# Patient Record
Sex: MALE | Race: BLACK OR AFRICAN AMERICAN | NOT HISPANIC OR LATINO | URBAN - METROPOLITAN AREA
[De-identification: names, ages, dates, MRNs, and addresses within clinical notes are randomized per-mention and may not be internally consistent; named-entity substitution may affect disease eponyms.]

---

## 2021-08-28 ENCOUNTER — INPATIENT (INPATIENT)
Facility: HOSPITAL | Age: 36
LOS: 3 days | Discharge: HOME | End: 2021-09-01
Attending: STUDENT IN AN ORGANIZED HEALTH CARE EDUCATION/TRAINING PROGRAM | Admitting: STUDENT IN AN ORGANIZED HEALTH CARE EDUCATION/TRAINING PROGRAM
Payer: COMMERCIAL

## 2021-08-28 VITALS
SYSTOLIC BLOOD PRESSURE: 136 MMHG | OXYGEN SATURATION: 98 % | HEART RATE: 109 BPM | WEIGHT: 315 LBS | DIASTOLIC BLOOD PRESSURE: 94 MMHG | RESPIRATION RATE: 16 BRPM | TEMPERATURE: 100 F

## 2021-08-28 LAB
ALBUMIN SERPL ELPH-MCNC: 4.4 G/DL — SIGNIFICANT CHANGE UP (ref 3.3–5)
ALP SERPL-CCNC: 92 U/L — SIGNIFICANT CHANGE UP (ref 40–120)
ALT FLD-CCNC: 50 U/L — HIGH (ref 4–41)
ANION GAP SERPL CALC-SCNC: 19 MMOL/L — HIGH (ref 7–14)
ANION GAP SERPL CALC-SCNC: 23 MMOL/L — HIGH (ref 7–14)
ANION GAP SERPL CALC-SCNC: 26 MMOL/L — HIGH (ref 7–14)
ANION GAP SERPL CALC-SCNC: 27 MMOL/L — HIGH (ref 7–14)
APPEARANCE UR: CLEAR — SIGNIFICANT CHANGE UP
AST SERPL-CCNC: 80 U/L — HIGH (ref 4–40)
BACTERIA # UR AUTO: NEGATIVE — SIGNIFICANT CHANGE UP
BASOPHILS # BLD AUTO: 0.05 K/UL — SIGNIFICANT CHANGE UP (ref 0–0.2)
BASOPHILS NFR BLD AUTO: 0.7 % — SIGNIFICANT CHANGE UP (ref 0–2)
BILIRUB SERPL-MCNC: 0.5 MG/DL — SIGNIFICANT CHANGE UP (ref 0.2–1.2)
BILIRUB UR-MCNC: NEGATIVE — SIGNIFICANT CHANGE UP
BLOOD GAS VENOUS COMPREHENSIVE RESULT: SIGNIFICANT CHANGE UP
BUN SERPL-MCNC: 10 MG/DL — SIGNIFICANT CHANGE UP (ref 7–23)
BUN SERPL-MCNC: 10 MG/DL — SIGNIFICANT CHANGE UP (ref 7–23)
BUN SERPL-MCNC: 9 MG/DL — SIGNIFICANT CHANGE UP (ref 7–23)
BUN SERPL-MCNC: 9 MG/DL — SIGNIFICANT CHANGE UP (ref 7–23)
CALCIUM SERPL-MCNC: 8.1 MG/DL — LOW (ref 8.4–10.5)
CALCIUM SERPL-MCNC: 8.7 MG/DL — SIGNIFICANT CHANGE UP (ref 8.4–10.5)
CALCIUM SERPL-MCNC: 9.2 MG/DL — SIGNIFICANT CHANGE UP (ref 8.4–10.5)
CALCIUM SERPL-MCNC: 9.8 MG/DL — SIGNIFICANT CHANGE UP (ref 8.4–10.5)
CHLORIDE SERPL-SCNC: 86 MMOL/L — LOW (ref 98–107)
CHLORIDE SERPL-SCNC: 89 MMOL/L — LOW (ref 98–107)
CHLORIDE SERPL-SCNC: 94 MMOL/L — LOW (ref 98–107)
CHLORIDE SERPL-SCNC: 97 MMOL/L — LOW (ref 98–107)
CO2 SERPL-SCNC: 13 MMOL/L — LOW (ref 22–31)
CO2 SERPL-SCNC: 15 MMOL/L — LOW (ref 22–31)
CO2 SERPL-SCNC: 17 MMOL/L — LOW (ref 22–31)
CO2 SERPL-SCNC: 18 MMOL/L — LOW (ref 22–31)
COLOR SPEC: SIGNIFICANT CHANGE UP
CREAT SERPL-MCNC: 0.75 MG/DL — SIGNIFICANT CHANGE UP (ref 0.5–1.3)
CREAT SERPL-MCNC: 0.84 MG/DL — SIGNIFICANT CHANGE UP (ref 0.5–1.3)
CREAT SERPL-MCNC: 0.85 MG/DL — SIGNIFICANT CHANGE UP (ref 0.5–1.3)
CREAT SERPL-MCNC: 0.85 MG/DL — SIGNIFICANT CHANGE UP (ref 0.5–1.3)
DIFF PNL FLD: NEGATIVE — SIGNIFICANT CHANGE UP
EOSINOPHIL # BLD AUTO: 0.15 K/UL — SIGNIFICANT CHANGE UP (ref 0–0.5)
EOSINOPHIL NFR BLD AUTO: 2.2 % — SIGNIFICANT CHANGE UP (ref 0–6)
EPI CELLS # UR: 2 /HPF — SIGNIFICANT CHANGE UP (ref 0–5)
GLUCOSE SERPL-MCNC: 305 MG/DL — HIGH (ref 70–99)
GLUCOSE SERPL-MCNC: 355 MG/DL — HIGH (ref 70–99)
GLUCOSE SERPL-MCNC: 439 MG/DL — HIGH (ref 70–99)
GLUCOSE SERPL-MCNC: 521 MG/DL — CRITICAL HIGH (ref 70–99)
GLUCOSE UR QL: ABNORMAL
HCT VFR BLD CALC: 45.4 % — SIGNIFICANT CHANGE UP (ref 39–50)
HGB BLD-MCNC: 16.5 G/DL — SIGNIFICANT CHANGE UP (ref 13–17)
IANC: 3.95 K/UL — SIGNIFICANT CHANGE UP (ref 1.5–8.5)
IMM GRANULOCYTES NFR BLD AUTO: 0.4 % — SIGNIFICANT CHANGE UP (ref 0–1.5)
KETONES UR-MCNC: ABNORMAL
LEUKOCYTE ESTERASE UR-ACNC: NEGATIVE — SIGNIFICANT CHANGE UP
LYMPHOCYTES # BLD AUTO: 2.13 K/UL — SIGNIFICANT CHANGE UP (ref 1–3.3)
LYMPHOCYTES # BLD AUTO: 31.5 % — SIGNIFICANT CHANGE UP (ref 13–44)
MAGNESIUM SERPL-MCNC: 2.1 MG/DL — SIGNIFICANT CHANGE UP (ref 1.6–2.6)
MAGNESIUM SERPL-MCNC: 2.1 MG/DL — SIGNIFICANT CHANGE UP (ref 1.6–2.6)
MCHC RBC-ENTMCNC: 30.3 PG — SIGNIFICANT CHANGE UP (ref 27–34)
MCHC RBC-ENTMCNC: 36.3 GM/DL — HIGH (ref 32–36)
MCV RBC AUTO: 83.3 FL — SIGNIFICANT CHANGE UP (ref 80–100)
MONOCYTES # BLD AUTO: 0.46 K/UL — SIGNIFICANT CHANGE UP (ref 0–0.9)
MONOCYTES NFR BLD AUTO: 6.8 % — SIGNIFICANT CHANGE UP (ref 2–14)
NEUTROPHILS # BLD AUTO: 3.95 K/UL — SIGNIFICANT CHANGE UP (ref 1.8–7.4)
NEUTROPHILS NFR BLD AUTO: 58.4 % — SIGNIFICANT CHANGE UP (ref 43–77)
NITRITE UR-MCNC: NEGATIVE — SIGNIFICANT CHANGE UP
NRBC # BLD: 0 /100 WBCS — SIGNIFICANT CHANGE UP
NRBC # FLD: 0 K/UL — SIGNIFICANT CHANGE UP
PH UR: 6.5 — SIGNIFICANT CHANGE UP (ref 5–8)
PHOSPHATE SERPL-MCNC: 3.9 MG/DL — SIGNIFICANT CHANGE UP (ref 2.5–4.5)
PHOSPHATE SERPL-MCNC: SIGNIFICANT CHANGE UP MG/DL (ref 2.5–4.5)
PLATELET # BLD AUTO: 266 K/UL — SIGNIFICANT CHANGE UP (ref 150–400)
POTASSIUM SERPL-MCNC: 5 MMOL/L — SIGNIFICANT CHANGE UP (ref 3.5–5.3)
POTASSIUM SERPL-MCNC: SIGNIFICANT CHANGE UP MMOL/L (ref 3.5–5.3)
POTASSIUM SERPL-SCNC: 5 MMOL/L — SIGNIFICANT CHANGE UP (ref 3.5–5.3)
POTASSIUM SERPL-SCNC: SIGNIFICANT CHANGE UP MMOL/L (ref 3.5–5.3)
PROT SERPL-MCNC: SIGNIFICANT CHANGE UP G/DL (ref 6–8.3)
PROT UR-MCNC: ABNORMAL
RBC # BLD: 5.45 M/UL — SIGNIFICANT CHANGE UP (ref 4.2–5.8)
RBC # FLD: 12.8 % — SIGNIFICANT CHANGE UP (ref 10.3–14.5)
RBC CASTS # UR COMP ASSIST: 1 /HPF — SIGNIFICANT CHANGE UP (ref 0–4)
SODIUM SERPL-SCNC: 126 MMOL/L — LOW (ref 135–145)
SODIUM SERPL-SCNC: 130 MMOL/L — LOW (ref 135–145)
SODIUM SERPL-SCNC: 134 MMOL/L — LOW (ref 135–145)
SODIUM SERPL-SCNC: 134 MMOL/L — LOW (ref 135–145)
SP GR SPEC: 1.04 — SIGNIFICANT CHANGE UP (ref 1–1.05)
UROBILINOGEN FLD QL: SIGNIFICANT CHANGE UP
WBC # BLD: 6.77 K/UL — SIGNIFICANT CHANGE UP (ref 3.8–10.5)
WBC # FLD AUTO: 6.77 K/UL — SIGNIFICANT CHANGE UP (ref 3.8–10.5)
WBC UR QL: 5 /HPF — SIGNIFICANT CHANGE UP (ref 0–5)

## 2021-08-28 PROCEDURE — 71046 X-RAY EXAM CHEST 2 VIEWS: CPT | Mod: 26

## 2021-08-28 RX ORDER — INSULIN HUMAN 100 [IU]/ML
5 INJECTION, SOLUTION SUBCUTANEOUS ONCE
Refills: 0 | Status: COMPLETED | OUTPATIENT
Start: 2021-08-28 | End: 2021-08-28

## 2021-08-28 RX ORDER — SODIUM CHLORIDE 9 MG/ML
1000 INJECTION INTRAMUSCULAR; INTRAVENOUS; SUBCUTANEOUS ONCE
Refills: 0 | Status: COMPLETED | OUTPATIENT
Start: 2021-08-28 | End: 2021-08-28

## 2021-08-28 RX ORDER — INSULIN LISPRO 100/ML
10 VIAL (ML) SUBCUTANEOUS ONCE
Refills: 0 | Status: COMPLETED | OUTPATIENT
Start: 2021-08-28 | End: 2021-08-28

## 2021-08-28 RX ADMIN — SODIUM CHLORIDE 1000 MILLILITER(S): 9 INJECTION INTRAMUSCULAR; INTRAVENOUS; SUBCUTANEOUS at 15:43

## 2021-08-28 RX ADMIN — Medication 10 UNIT(S): at 18:01

## 2021-08-28 RX ADMIN — Medication 50 MILLIGRAM(S): at 17:58

## 2021-08-28 RX ADMIN — INSULIN HUMAN 5 UNIT(S): 100 INJECTION, SOLUTION SUBCUTANEOUS at 21:17

## 2021-08-28 RX ADMIN — SODIUM CHLORIDE 1000 MILLILITER(S): 9 INJECTION INTRAMUSCULAR; INTRAVENOUS; SUBCUTANEOUS at 21:57

## 2021-08-28 NOTE — ED PROVIDER NOTE - NS ED ROS FT
GENERAL: No fever or chills  EYES: no change in vision  HEENT: no trouble swallowing or speaking  CARDIAC: no chest pain or palpiations   PULMONARY: no cough or SOB  GI: no abdominal pain, nausea, vomiting, diarrhea, or constipation   : see hpi  SKIN: no rashes  NEURO: no headache, numbness, or weakness.  MSK: No joint pain

## 2021-08-28 NOTE — ED PROVIDER NOTE - ATTENDING CONTRIBUTION TO CARE
I have personally performed a face to face medical and diagnostic evaluation of the patient. I have discussed with and reviewed the Resident's note and agree with the History, ROS, Physical Exam and MDM unless otherwise indicated. A brief summary of my personal evaluation and impression can be found below.     34 yo M with pre-diabetes, obesity and alcohol use presents with 2 weeks of weakness, polyuria and polydipsia with a FS of 500. Reports mild suprapubic pressure and urinary frequency. Denies dysuria. No flank pain. No cough or fever. No N, V or D. No chest pain or SOB. Drinks 6 beers daily. No hx of alcohol withdrawal or seizures. Last drink was yesterday.  VITALS: Initial triage and subsequent vitals have been reviewed by me.  Gen: Well appearing, NAD, alert, non-toxic  Head: NCAT  HEENT: MMM, normal conjunctiva, anicteric, neck supple  Lung: CTAB, no adventitious sounds  CV: RRR, no murmurs, 2+symmetric peripheral pulses  Abd: soft, NTND, no rebound or guarding, no palpable masses  MSK: No edema, no visible deformities  Neuro: Moving all extremity grossly, following commands appropriately, fluid speech, no tremors/fasciculations  Skin: Warm and dry, no evidence of rash  Psych: normal mood and affect  Concern of DKA will get labs r/o dka and xr/ua r/o underlying infx process. If not in dka will likely need CDU for endo eval in AM

## 2021-08-28 NOTE — ED PROVIDER NOTE - CLINICAL SUMMARY MEDICAL DECISION MAKING FREE TEXT BOX
36 yo M with pre-diabetes, obesity and alcohol use presents with 2 weeks of weakness, polyuria and polydipsia with a FS of 500. Mild suprapubic ttp. Hyperglycemia could 2/2 to underlying infection (UTI), vs DKA. Will obtain basic labs, A1C, vbg, start fluids and reassess.

## 2021-08-28 NOTE — ED ADULT TRIAGE NOTE - CHIEF COMPLAINT QUOTE
c/o increased urinary frequency with occasional suprapubic discomfort and fatigue x 1 week. Sent by urgent care for elevated glucose. FS here 573

## 2021-08-28 NOTE — ED PROVIDER NOTE - OBJECTIVE STATEMENT
36 yo M with pre-diabetes, obesity and alcohol use presents with 2 weeks of weakness, polyuria and polydipsia with a FS of 500. Reports mild suprapubic pressure and urinary frequency. Denies dysuria. No flank pain. No cough or fever. No N, V or D. No chest pain or SOB. Drinks 6 beers daily. No hx of alcohol withdrawal or seizures. Last drink was yesterday.

## 2021-08-28 NOTE — ED PROVIDER NOTE - PHYSICAL EXAMINATION
Gen: AAOx3, non-toxic  Head: NCAT  HEENT: EOMI, oral mucosa dry, normal conjunctiva  Lung: CTAB, no respiratory distress, no wheezes/rhonchi/rales B/L, speaking in full sentences  CV: RRR, no murmurs, rubs or gallops  Abd: mild suprapubic ttp, no guarding, mid R  CVA tenderness  MSK: no visible deformities  Neuro: No focal sensory or motor deficits, normal CN exam. No tremors.   Skin: Warm, well perfused, no rash  Psych: normal affect.

## 2021-08-29 DIAGNOSIS — E11.10 TYPE 2 DIABETES MELLITUS WITH KETOACIDOSIS WITHOUT COMA: ICD-10-CM

## 2021-08-29 DIAGNOSIS — I10 ESSENTIAL (PRIMARY) HYPERTENSION: ICD-10-CM

## 2021-08-29 DIAGNOSIS — E11.9 TYPE 2 DIABETES MELLITUS WITHOUT COMPLICATIONS: ICD-10-CM

## 2021-08-29 DIAGNOSIS — E78.5 HYPERLIPIDEMIA, UNSPECIFIED: ICD-10-CM

## 2021-08-29 DIAGNOSIS — E11.65 TYPE 2 DIABETES MELLITUS WITH HYPERGLYCEMIA: ICD-10-CM

## 2021-08-29 DIAGNOSIS — Z29.9 ENCOUNTER FOR PROPHYLACTIC MEASURES, UNSPECIFIED: ICD-10-CM

## 2021-08-29 DIAGNOSIS — E66.01 MORBID (SEVERE) OBESITY DUE TO EXCESS CALORIES: ICD-10-CM

## 2021-08-29 DIAGNOSIS — F10.10 ALCOHOL ABUSE, UNCOMPLICATED: ICD-10-CM

## 2021-08-29 LAB
ANION GAP SERPL CALC-SCNC: 18 MMOL/L — HIGH (ref 7–14)
ANION GAP SERPL CALC-SCNC: 22 MMOL/L — HIGH (ref 7–14)
ANION GAP SERPL CALC-SCNC: 23 MMOL/L — HIGH (ref 7–14)
B-OH-BUTYR SERPL-SCNC: 2.6 MMOL/L — HIGH (ref 0–0.4)
BASOPHILS # BLD AUTO: 0.02 K/UL — SIGNIFICANT CHANGE UP (ref 0–0.2)
BASOPHILS NFR BLD AUTO: 0.3 % — SIGNIFICANT CHANGE UP (ref 0–2)
BLOOD GAS VENOUS COMPREHENSIVE RESULT: SIGNIFICANT CHANGE UP
BUN SERPL-MCNC: 9 MG/DL — SIGNIFICANT CHANGE UP (ref 7–23)
CALCIUM SERPL-MCNC: 8.1 MG/DL — LOW (ref 8.4–10.5)
CALCIUM SERPL-MCNC: 8.1 MG/DL — LOW (ref 8.4–10.5)
CALCIUM SERPL-MCNC: 8.3 MG/DL — LOW (ref 8.4–10.5)
CHLORIDE SERPL-SCNC: 95 MMOL/L — LOW (ref 98–107)
CO2 SERPL-SCNC: 15 MMOL/L — LOW (ref 22–31)
CO2 SERPL-SCNC: 15 MMOL/L — LOW (ref 22–31)
CO2 SERPL-SCNC: 18 MMOL/L — LOW (ref 22–31)
CREAT SERPL-MCNC: 0.78 MG/DL — SIGNIFICANT CHANGE UP (ref 0.5–1.3)
CREAT SERPL-MCNC: 0.81 MG/DL — SIGNIFICANT CHANGE UP (ref 0.5–1.3)
CREAT SERPL-MCNC: 0.82 MG/DL — SIGNIFICANT CHANGE UP (ref 0.5–1.3)
EOSINOPHIL # BLD AUTO: 0.26 K/UL — SIGNIFICANT CHANGE UP (ref 0–0.5)
EOSINOPHIL NFR BLD AUTO: 4.4 % — SIGNIFICANT CHANGE UP (ref 0–6)
GLUCOSE BLDC GLUCOMTR-MCNC: 232 MG/DL — HIGH (ref 70–99)
GLUCOSE BLDC GLUCOMTR-MCNC: 233 MG/DL — HIGH (ref 70–99)
GLUCOSE BLDC GLUCOMTR-MCNC: 272 MG/DL — HIGH (ref 70–99)
GLUCOSE BLDC GLUCOMTR-MCNC: 295 MG/DL — HIGH (ref 70–99)
GLUCOSE SERPL-MCNC: 247 MG/DL — HIGH (ref 70–99)
GLUCOSE SERPL-MCNC: 316 MG/DL — HIGH (ref 70–99)
GLUCOSE SERPL-MCNC: 367 MG/DL — HIGH (ref 70–99)
HCT VFR BLD CALC: 43.8 % — SIGNIFICANT CHANGE UP (ref 39–50)
HGB BLD-MCNC: 15 G/DL — SIGNIFICANT CHANGE UP (ref 13–17)
IANC: 3.51 K/UL — SIGNIFICANT CHANGE UP (ref 1.5–8.5)
IMM GRANULOCYTES NFR BLD AUTO: 0.3 % — SIGNIFICANT CHANGE UP (ref 0–1.5)
LYMPHOCYTES # BLD AUTO: 1.78 K/UL — SIGNIFICANT CHANGE UP (ref 1–3.3)
LYMPHOCYTES # BLD AUTO: 29.8 % — SIGNIFICANT CHANGE UP (ref 13–44)
MAGNESIUM SERPL-MCNC: 1.9 MG/DL — SIGNIFICANT CHANGE UP (ref 1.6–2.6)
MCHC RBC-ENTMCNC: 29.7 PG — SIGNIFICANT CHANGE UP (ref 27–34)
MCHC RBC-ENTMCNC: 34.2 GM/DL — SIGNIFICANT CHANGE UP (ref 32–36)
MCV RBC AUTO: 86.7 FL — SIGNIFICANT CHANGE UP (ref 80–100)
MONOCYTES # BLD AUTO: 0.38 K/UL — SIGNIFICANT CHANGE UP (ref 0–0.9)
MONOCYTES NFR BLD AUTO: 6.4 % — SIGNIFICANT CHANGE UP (ref 2–14)
NEUTROPHILS # BLD AUTO: 3.51 K/UL — SIGNIFICANT CHANGE UP (ref 1.8–7.4)
NEUTROPHILS NFR BLD AUTO: 58.8 % — SIGNIFICANT CHANGE UP (ref 43–77)
NRBC # BLD: 0 /100 WBCS — SIGNIFICANT CHANGE UP
NRBC # FLD: 0 K/UL — SIGNIFICANT CHANGE UP
PHOSPHATE SERPL-MCNC: 2.7 MG/DL — SIGNIFICANT CHANGE UP (ref 2.5–4.5)
PLATELET # BLD AUTO: 234 K/UL — SIGNIFICANT CHANGE UP (ref 150–400)
POTASSIUM SERPL-MCNC: 4.5 MMOL/L — SIGNIFICANT CHANGE UP (ref 3.5–5.3)
POTASSIUM SERPL-MCNC: 4.7 MMOL/L — SIGNIFICANT CHANGE UP (ref 3.5–5.3)
POTASSIUM SERPL-MCNC: SIGNIFICANT CHANGE UP MMOL/L (ref 3.5–5.3)
POTASSIUM SERPL-SCNC: 4.5 MMOL/L — SIGNIFICANT CHANGE UP (ref 3.5–5.3)
POTASSIUM SERPL-SCNC: 4.7 MMOL/L — SIGNIFICANT CHANGE UP (ref 3.5–5.3)
POTASSIUM SERPL-SCNC: SIGNIFICANT CHANGE UP MMOL/L (ref 3.5–5.3)
RBC # BLD: 5.05 M/UL — SIGNIFICANT CHANGE UP (ref 4.2–5.8)
RBC # FLD: 12.9 % — SIGNIFICANT CHANGE UP (ref 10.3–14.5)
SARS-COV-2 RNA SPEC QL NAA+PROBE: SIGNIFICANT CHANGE UP
SODIUM SERPL-SCNC: 131 MMOL/L — LOW (ref 135–145)
SODIUM SERPL-SCNC: 132 MMOL/L — LOW (ref 135–145)
SODIUM SERPL-SCNC: 133 MMOL/L — LOW (ref 135–145)
WBC # BLD: 5.97 K/UL — SIGNIFICANT CHANGE UP (ref 3.8–10.5)
WBC # FLD AUTO: 5.97 K/UL — SIGNIFICANT CHANGE UP (ref 3.8–10.5)

## 2021-08-29 PROCEDURE — 99223 1ST HOSP IP/OBS HIGH 75: CPT | Mod: GC

## 2021-08-29 PROCEDURE — 99223 1ST HOSP IP/OBS HIGH 75: CPT

## 2021-08-29 RX ORDER — INSULIN LISPRO 100/ML
VIAL (ML) SUBCUTANEOUS AT BEDTIME
Refills: 0 | Status: DISCONTINUED | OUTPATIENT
Start: 2021-08-29 | End: 2021-08-29

## 2021-08-29 RX ORDER — ONDANSETRON 8 MG/1
4 TABLET, FILM COATED ORAL EVERY 8 HOURS
Refills: 0 | Status: DISCONTINUED | OUTPATIENT
Start: 2021-08-29 | End: 2021-09-01

## 2021-08-29 RX ORDER — SODIUM CHLORIDE 9 MG/ML
1000 INJECTION INTRAMUSCULAR; INTRAVENOUS; SUBCUTANEOUS
Refills: 0 | Status: DISCONTINUED | OUTPATIENT
Start: 2021-08-29 | End: 2021-08-29

## 2021-08-29 RX ORDER — SODIUM CHLORIDE 9 MG/ML
1000 INJECTION, SOLUTION INTRAVENOUS
Refills: 0 | Status: DISCONTINUED | OUTPATIENT
Start: 2021-08-29 | End: 2021-09-01

## 2021-08-29 RX ORDER — POTASSIUM CHLORIDE 20 MEQ
40 PACKET (EA) ORAL EVERY 4 HOURS
Refills: 0 | Status: COMPLETED | OUTPATIENT
Start: 2021-08-29 | End: 2021-08-29

## 2021-08-29 RX ORDER — LANOLIN ALCOHOL/MO/W.PET/CERES
3 CREAM (GRAM) TOPICAL AT BEDTIME
Refills: 0 | Status: DISCONTINUED | OUTPATIENT
Start: 2021-08-29 | End: 2021-09-01

## 2021-08-29 RX ORDER — INSULIN LISPRO 100/ML
10 VIAL (ML) SUBCUTANEOUS
Refills: 0 | Status: DISCONTINUED | OUTPATIENT
Start: 2021-08-29 | End: 2021-08-29

## 2021-08-29 RX ORDER — POTASSIUM CHLORIDE 20 MEQ
40 PACKET (EA) ORAL ONCE
Refills: 0 | Status: COMPLETED | OUTPATIENT
Start: 2021-08-29 | End: 2021-08-29

## 2021-08-29 RX ORDER — ENOXAPARIN SODIUM 100 MG/ML
40 INJECTION SUBCUTANEOUS DAILY
Refills: 0 | Status: DISCONTINUED | OUTPATIENT
Start: 2021-08-29 | End: 2021-09-01

## 2021-08-29 RX ORDER — INSULIN LISPRO 100/ML
15 VIAL (ML) SUBCUTANEOUS EVERY 4 HOURS
Refills: 0 | Status: DISCONTINUED | OUTPATIENT
Start: 2021-08-29 | End: 2021-08-29

## 2021-08-29 RX ORDER — POTASSIUM CHLORIDE 20 MEQ
40 PACKET (EA) ORAL EVERY 4 HOURS
Refills: 0 | Status: DISCONTINUED | OUTPATIENT
Start: 2021-08-29 | End: 2021-08-29

## 2021-08-29 RX ORDER — INSULIN LISPRO 100/ML
VIAL (ML) SUBCUTANEOUS
Refills: 0 | Status: DISCONTINUED | OUTPATIENT
Start: 2021-08-29 | End: 2021-08-29

## 2021-08-29 RX ORDER — DEXTROSE 50 % IN WATER 50 %
25 SYRINGE (ML) INTRAVENOUS ONCE
Refills: 0 | Status: DISCONTINUED | OUTPATIENT
Start: 2021-08-29 | End: 2021-09-01

## 2021-08-29 RX ORDER — SODIUM CHLORIDE 9 MG/ML
1000 INJECTION INTRAMUSCULAR; INTRAVENOUS; SUBCUTANEOUS
Refills: 0 | Status: DISCONTINUED | OUTPATIENT
Start: 2021-08-29 | End: 2021-08-30

## 2021-08-29 RX ORDER — INSULIN GLARGINE 100 [IU]/ML
31 INJECTION, SOLUTION SUBCUTANEOUS ONCE
Refills: 0 | Status: COMPLETED | OUTPATIENT
Start: 2021-08-29 | End: 2021-08-29

## 2021-08-29 RX ORDER — INSULIN LISPRO 100/ML
7 VIAL (ML) SUBCUTANEOUS EVERY 4 HOURS
Refills: 0 | Status: DISCONTINUED | OUTPATIENT
Start: 2021-08-29 | End: 2021-08-30

## 2021-08-29 RX ORDER — DEXTROSE 50 % IN WATER 50 %
15 SYRINGE (ML) INTRAVENOUS ONCE
Refills: 0 | Status: DISCONTINUED | OUTPATIENT
Start: 2021-08-29 | End: 2021-09-01

## 2021-08-29 RX ORDER — INSULIN GLARGINE 100 [IU]/ML
36 INJECTION, SOLUTION SUBCUTANEOUS AT BEDTIME
Refills: 0 | Status: DISCONTINUED | OUTPATIENT
Start: 2021-08-29 | End: 2021-08-30

## 2021-08-29 RX ORDER — SODIUM CHLORIDE 9 MG/ML
1000 INJECTION, SOLUTION INTRAVENOUS ONCE
Refills: 0 | Status: COMPLETED | OUTPATIENT
Start: 2021-08-29 | End: 2021-08-29

## 2021-08-29 RX ORDER — DEXTROSE 50 % IN WATER 50 %
12.5 SYRINGE (ML) INTRAVENOUS ONCE
Refills: 0 | Status: DISCONTINUED | OUTPATIENT
Start: 2021-08-29 | End: 2021-09-01

## 2021-08-29 RX ORDER — GLUCAGON INJECTION, SOLUTION 0.5 MG/.1ML
1 INJECTION, SOLUTION SUBCUTANEOUS ONCE
Refills: 0 | Status: DISCONTINUED | OUTPATIENT
Start: 2021-08-29 | End: 2021-09-01

## 2021-08-29 RX ADMIN — Medication 10 UNIT(S): at 07:47

## 2021-08-29 RX ADMIN — Medication 10 UNIT(S): at 12:22

## 2021-08-29 RX ADMIN — SODIUM CHLORIDE 125 MILLILITER(S): 9 INJECTION INTRAMUSCULAR; INTRAVENOUS; SUBCUTANEOUS at 22:22

## 2021-08-29 RX ADMIN — SODIUM CHLORIDE 125 MILLILITER(S): 9 INJECTION INTRAMUSCULAR; INTRAVENOUS; SUBCUTANEOUS at 04:12

## 2021-08-29 RX ADMIN — Medication 6: at 12:22

## 2021-08-29 RX ADMIN — Medication 40 MILLIEQUIVALENT(S): at 14:47

## 2021-08-29 RX ADMIN — INSULIN GLARGINE 31 UNIT(S): 100 INJECTION, SOLUTION SUBCUTANEOUS at 01:10

## 2021-08-29 RX ADMIN — Medication 15 UNIT(S): at 17:18

## 2021-08-29 RX ADMIN — SODIUM CHLORIDE 125 MILLILITER(S): 9 INJECTION INTRAMUSCULAR; INTRAVENOUS; SUBCUTANEOUS at 16:09

## 2021-08-29 RX ADMIN — SODIUM CHLORIDE 1000 MILLILITER(S): 9 INJECTION, SOLUTION INTRAVENOUS at 13:11

## 2021-08-29 RX ADMIN — INSULIN GLARGINE 36 UNIT(S): 100 INJECTION, SOLUTION SUBCUTANEOUS at 22:12

## 2021-08-29 RX ADMIN — Medication 40 MILLIEQUIVALENT(S): at 22:52

## 2021-08-29 RX ADMIN — Medication 8: at 07:45

## 2021-08-29 RX ADMIN — Medication 7 UNIT(S): at 22:17

## 2021-08-29 NOTE — ED CDU PROVIDER DISPOSITION NOTE - ATTENDING CONTRIBUTION TO CARE
35M h/o pre-DM, obesity, ETOH abuse (6 beers/day, no h/o withdrawal), sent from urgent care for hyperglycemia with FS 500s. Pt reports polyuria x 2wks, polydipsia, fatigue. Initial labs showed AG of 27, pH normal, beta hydroxy 2.0. Received 3L IVF, Humilin R 5units and sent to CDU. In CDU received maintenance fluids, Lantus 31 units at bedtime, Admelog 10 units with meals and sliding scale. FS remain in the 300s, HbA1c 10.8, AG 19-23. Endocrine previously consulted, tba for glycemic control and endo follow-up.

## 2021-08-29 NOTE — H&P ADULT - HISTORY OF PRESENT ILLNESS
Reports mild suprapubic pressure and urinary frequency. Denies dysuria. No flank pain. No cough or fever. No N, V or D. No chest pain or SOB. Drinks 6 beers daily. No hx of alcohol withdrawal or seizures. Last drink was yesterday.    34 yo M with PMH of Pre-DM, obesity, ETOH abuse presenting with hyperglycemia, FS 500s w/ 2 weeks of polyuria, polydipsia, fatigue. Drinks 6 beers daily, no hx of withdrawal. Last drink 8/26.       In ED labs showing FS 500s, anion gap 27, pH normal, beta hydroxy 2.0. Now s/p 3L fluids, Humilin R 5 units pt sent to CDU. In CDU received maintenance fluids, Lantus 31 units at bedtime, Admelog 10 units with meals and sliding scale. Pt continues to have hyperglycemia, cbc w/ glucose 400 this morning, pH normal on rpt gas. Endocrine previously consulted, plan for admission. 36 yo M with PMH of Pre-DM, obesity, ETOH abuse presenting with hyperglycemia, FS 500s w/ 2 weeks of polyuria, polydipsia, fatigue. Has not followed regularly with any PCP. Went to urgent care after feeling significant weakness, found to have sugars in 500s. Additionally, drinks 6 beers daily. Tried to quit around a year ago but felt tachycardic and uncomfortable so resumed drinking. Has never had a seizure. Last drink 8/26. Also drinking alot of sodas in past few weeks as he felt very thirsty. Pt feels very motivated to quit drinking and get sugars under control this admission. Also reports mild suprapubic pressure and urinary frequency. Pt has noticed small wounds do not heal very fast. Also feels like he has mild leg edema, pain/numbness in his feet.    In ED labs showing FS 500s, anion gap 27, pH normal, beta hydroxy 2.0. UA noninfectious. Now s/p 3L fluids, Humilin R 5 units pt sent to CDU. In CDU received maintenance fluids, Lantus 31 units at bedtime, Admelog 10 units with meals and sliding scale. FS now still in 300s, endo consulted. Remains on IVFs, NPO.

## 2021-08-29 NOTE — H&P ADULT - NSHPLABSRESULTS_GEN_ALL_CORE
15.0   5.97  )-----------( 234      ( 29 Aug 2021 07:50 )             43.8           133<L>  |  95<L>  |  9   ----------------------------<  367<H>  4.7   |  15<L>  |  0.82    Ca    8.1<L>      29 Aug 2021 09:37  Phos  3.9       Mg     2.10         TPro  TNP  /  Alb  4.4  /  TBili  0.5  /  DBili  x   /  AST  80<H>  /  ALT  50<H>  /  AlkPhos  92                Urinalysis Basic - ( 28 Aug 2021 16:36 )    Color: Light Yellow / Appearance: Clear / S.037 / pH: x  Gluc: x / Ketone: Small  / Bili: Negative / Urobili: <2 mg/dL   Blood: x / Protein: Trace / Nitrite: Negative   Leuk Esterase: Negative / RBC: 1 /HPF / WBC 5 /HPF   Sq Epi: x / Non Sq Epi: 2 /HPF / Bacteria: Negative            Lactate Trend            CAPILLARY BLOOD GLUCOSE    CAPILLARY BLOOD GLUCOSE      POCT Blood Glucose.: 295 mg/dL (29 Aug 2021 12:13)  POCT Blood Glucose.: 336 mg/dL (29 Aug 2021 07:20)  POCT Blood Glucose.: 315 mg/dL (29 Aug 2021 00:56)  POCT Blood Glucose.: 335 mg/dL (28 Aug 2021 23:34)  POCT Blood Glucose.: 337 mg/dL (28 Aug 2021 22:11)  POCT Blood Glucose.: 358 mg/dL (28 Aug 2021 20:18)  POCT Blood Glucose.: 362 mg/dL (28 Aug 2021 19:51)  POCT Blood Glucose.: 452 mg/dL (28 Aug 2021 17:50)  POCT Blood Glucose.: 571 mg/dL (28 Aug 2021 14:51)

## 2021-08-29 NOTE — H&P ADULT - ASSESSMENT
Mr. Quiroz is a 35M with h/o EtOH abuse, obesity, and pre-DM now presenting with hyperglycemia (A1c 10.8, FS 500s). Now s/p 4L bolus LR in ED with downtrending FS now in 300s.

## 2021-08-29 NOTE — PATIENT PROFILE ADULT - NSTRANSFERBELONGINGSDISPO_GEN_A_NUR
Type 2 diabetes mellitus Type 2 diabetes mellitus Type 2 diabetes mellitus Type 2 diabetes mellitus with patient Type 2 diabetes mellitus Type 2 diabetes mellitus Type 2 diabetes mellitus Type 2 diabetes mellitus Type 2 diabetes mellitus Type 2 diabetes mellitus Type 2 diabetes mellitus

## 2021-08-29 NOTE — H&P ADULT - PROBLEM SELECTOR PLAN 4
Diet: NPO except water with meds until FS<250  DVT ppx: lovenox 40 qd - Encouraged weight loss & lifestyle changes including dietary modifications & exercise.   - consider outpatient obesity medicine referral  - send lipid panel

## 2021-08-29 NOTE — H&P ADULT - PROBLEM SELECTOR PLAN 2
-poor outpatient follow up, A1c was 10.8  -will need outpatient diabetes teaching, PCP follow-up, foot and eye exams  -further recs for transition to outpatient regimen per endo -poor outpatient follow up, A1c was 10.8  -will need diabetes teaching, PCP follow-up, foot and eye exams  -further recs for transition to outpatient regimen per endo  -nutrition consult

## 2021-08-29 NOTE — ED CDU PROVIDER INITIAL DAY NOTE - OBJECTIVE STATEMENT
36 yo M with PMH of Pre-DM, obesity, ETOH abuse, sent from urgent care to Er c/o hyperglycemia with Fs of 500 a/w fatigue a/w polyuria, polydipsia for 2 weeks. Reports he feels tired, doesn't want to do anything at home. Admits he's drinking a lot of water and urinating more. Admits he drinks 6 beers daily for years, last drink yesterday, no hx of hospitalization for withdrawal or seizure. Denies any fever, chills, n/v/d/c, chest pain, sob, abdominal pain, back pain, hematuria, burning on urination, weakness, numbness, headache, palpitation, or any other complaints.

## 2021-08-29 NOTE — H&P ADULT - PROBLEM SELECTOR PLAN 3
6 beers per day, last drink 8/26. Has had mild withdrawal sxs in past but no seizure  -s/p librium 50mg 8/28 in ED  -pt is highly motivated to quit drinking  -No withdrawal sxs currently but pt is at high risk for withdrawal while inpatient  -Mild elevation in transaminases could be a sign of cirrhosis AST 80, ALT 50.  -RUQUS? 6 beers per day, last drink 8/26. Has had mild withdrawal sxs in past but no seizure  -s/p librium 50mg 8/28 in ED  -pt is highly motivated to quit drinking  -No withdrawal sxs currently but pt is at risk for withdrawal while inpatient  -symptom-triggered CIWAs to monitor  -Mild elevation in transaminases likely iso alcohol use AST 80, ALT 50. CTM 6 beers per day, last drink 8/26. Has had mild withdrawal sxs in past but no seizure  -s/p librium 50mg 8/28 in ED  -pt is highly motivated to quit drinking  -No withdrawal sxs currently but pt is at risk for withdrawal while inpatient  -symptom-triggered CIWAs to monitor  -Mild elevation in transaminases likely iso alcohol use AST 80, ALT 50. CTM LFTs

## 2021-08-29 NOTE — PHARMACOTHERAPY INTERVENTION NOTE - COMMENTS
Medication history is complete. Medication list updated in Outpatient Medication Record ([facility]). Please call spectra [phone] if you have any questions.

## 2021-08-29 NOTE — ED CDU PROVIDER INITIAL DAY NOTE - ATTENDING CONTRIBUTION TO CARE
MD Inman:  I have personally performed a face to face diagnostic evaluation on this patient with the PA.  I have reviewed the ACP note and agree with the history, exam, and plan of care, except as noted.  History and Exam by me shows 36 yo M, presented to ED with mild DKA.  AG improved s/p IVF and insulin.  Needs K repletion, PRN benzos (possible ETOH dependence) and Endo consult.    VS: wnl.  Physical Exam: adult M, NAD, NCAT, PERRL, EOMI, neck supple, RRR, CTA B, Abd: s/nd/nt, Ext: no edema, Neuro: AAOx3, ambulates w/o diff, strength 5/5 & symmetric throughout.  Impression:  improving DKA  Plan:  CDU for glucose control, IVF/hydration, Endocrinology consult.  SBIRT.

## 2021-08-29 NOTE — ED CDU PROVIDER INITIAL DAY NOTE - PROGRESS NOTE DETAILS
Glucose 400 on chemistry this morning, rpt VBG with normal pH. Discussed with attg, place for admission, endocrine had already been consulted previously. Spoke with hospitalist who accepts pt, text paged MAR

## 2021-08-29 NOTE — CONSULT NOTE ADULT - ATTENDING COMMENTS
I agree with the A/P of Dr. Benavidez.   At this time it is important to resolve the mild DKA. Will follow protocol of q 4H insulin until resolution in anion gap.   Once AG, can advance to diet with basal bolus.

## 2021-08-29 NOTE — H&P ADULT - ATTENDING COMMENTS
35 year old male with history of pre-DM, obesity, EtOH abuse, presented with weakness, polyuria, polydipsia, found to have glucose in 500s, A1C 10.8, AG 27, elevated beta hydroxy. Continue NPO, IVF, q4hr BMPs, subcutaneous insulin per Endo, recs appreciated. Monitor for EtOH withdrawal on CIWA. Nutrition c/s.

## 2021-08-29 NOTE — H&P ADULT - PROBLEM SELECTOR PLAN 1
Mild DKA given VBG pH 7.34, BHB 2.6, AG 27, BGs 500s. Treated with subq insulin.   -Now s/p 4L IVF boluses, 31u lantus and 10u premeals and sugars in 300s  -Endo consulted, appreciate recs  -Continue IV LR boluses prn  -monitor BMP q4hr, replete potassium if <5.   -Monitor FS q4hr  -Keep NPO except water and meds until FS<250 then restart IVF with D5  -continue lantus 31u, 10 lispro ac, medium dose SSI Mild DKA given VBG pH 7.34, BHB 2.6, AG 27, BGs 500s. Treated with subq insulin.   -Now s/p 4L IVF boluses, 31u lantus and 10u premeals and sugars in 300s  -Endo consulted, appreciate recs:    	- NPO until DKA resolved, AG < 14, Bicarb > 18  	- IVF Hydration   	- If Blood glucose less then 250mg/dL, start Dextrose containing IVF and continue insulin treatment per 		DKA protocol   	- Lantus 36 units SC QHS   	- Start Admelog 15 units [id number] until BG less then 250mg/dL  	- Once BG level less than 250mg/dL, Decrease Admelog to 7 units SC [id number]   	- Once DKA resolved, AG < 14 and Bicarb > 18:   	Decrease Admelog to 7 units SC TIDAC/Premeal  	Restart Carb Consistent Diet   	Moderate Correction Scale [id number] >> change to tidac/premeal once dka resolved & po diet resumed   	FS BG Monitoring [id number] until resolution of DKA >> change to tidac/premeal & bedtime once dka 		resolved & po diet resumed Mild DKA given VBG pH 7.34, BHB 2.6, AG 27, BGs 500s. Treated with subq insulin.   -Now s/p 4L IVF boluses, 31u lantus and 10u premeals and sugars in 300s  -Endo consulted, appreciate recs:    	- NPO until DKA resolved, AG < 14, Bicarb > 18  	- IVF Hydration   	- If Blood glucose less then 250mg/dL, start Dextrose containing IVF and continue insulin treatment per 		DKA protocol   	- Lantus 36 units SC QHS   	- Start Admelog 15 units [id number] until BG less then 250mg/dL  	- Once BG level less than 250mg/dL, Decrease Admelog to 7 units SC [id number]   	- Once DKA resolved, AG < 14 and Bicarb > 18:  Decrease Admelog to 7 units SC TIDAC/Premeal. Restart 	Carb Consistent Diet   	No Correction Scale >> change to moderate scale tidac/premeal once dka resolved & po diet resumed   	FS BG Monitoring [id number] until resolution of DKA >> change to tidac/premeal & bedtime once dka 		resolved & po diet resumed

## 2021-08-29 NOTE — H&P ADULT - NSHPPHYSICALEXAM_GEN_ALL_CORE
Vital Signs Last 24 Hrs  T(C): 36.8 (29 Aug 2021 10:15), Max: 37.5 (28 Aug 2021 14:46)  T(F): 98.2 (29 Aug 2021 10:15), Max: 99.5 (28 Aug 2021 14:46)  HR: 89 (29 Aug 2021 10:15) (82 - 109)  BP: 113/75 (29 Aug 2021 10:15) (113/75 - 144/80)  BP(mean): --  RR: 16 (29 Aug 2021 10:15) (16 - 18)  SpO2: 96% (29 Aug 2021 10:15) (96% - 100%)    CONSTITUTIONAL: Well-groomed, in no apparent distress  EYES: No conjunctival or scleral injection, non-icteric; PERRLA and symmetric  ENMT: No external nasal lesions; nasal mucosa not inflamed; normal dentition; no pharyngeal injection or exudates, oral mucosa with moist membranes  NECK: Trachea midline without palpable neck mass; thyroid not enlarged and non-tender  RESPIRATORY: Breathing comfortably; no dullness to percussion; lungs CTA without wheeze/rhonchi/rales  CARDIOVASCULAR: +S1S2, RRR, no M/G/R; no carotid bruits; pedal pulses full and symmetric; no lower extremity edema  CHEST/BREAST: Breasts are symmetric in appearance; no palpable masses or lumps  GASTROINTESTINAL: No palpable masses or tenderness, +BS throughout, no rebound/guarding; no hepatosplenomegaly; no hernia palpated  adnexa without tenderness or mass  LYMPHATIC: No cervical LAD or tenderness; no axillary LAD or tenderness; no inguinal LAD or tenderness  MUSCULOSKELETAL: Normal gait and station; no digital clubbing or cyanosis; no paraspinal tenderness; examination of the  (head/neck, spine/ribs/pelvis, RUE, LUE, RLE, LLE) without misalignment, normal strength and tone of extremities  SKIN: Healing wound over R knee. No visible foot ulcers.  NEUROLOGIC: CN II-XII intact; 5/5 strength in bilateral extremities. Sensation intact to touch on plantar surface of feet. Mildly impaired great toe propioception.  PSYCHIATRIC: A+O x 3; mood and affect appropriate; appropriate insight and judgment Vital Signs Last 24 Hrs  T(C): 36.8 (29 Aug 2021 10:15), Max: 37.5 (28 Aug 2021 14:46)  T(F): 98.2 (29 Aug 2021 10:15), Max: 99.5 (28 Aug 2021 14:46)  HR: 89 (29 Aug 2021 10:15) (82 - 109)  BP: 113/75 (29 Aug 2021 10:15) (113/75 - 144/80)  BP(mean): --  RR: 16 (29 Aug 2021 10:15) (16 - 18)  SpO2: 96% (29 Aug 2021 10:15) (96% - 100%)    CONSTITUTIONAL: Well-groomed, in no apparent distress  EYES: No conjunctival or scleral injection, non-icteric; PERRLA and symmetric  ENMT: No external nasal lesions; nasal mucosa not inflamed; normal dentition; no pharyngeal injection or exudates, oral mucosa with moist membranes  NECK: Trachea midline without palpable neck mass; thyroid not enlarged and non-tender  RESPIRATORY: Breathing comfortably; no dullness to percussion; lungs CTA without wheeze/rhonchi/rales  CARDIOVASCULAR: +S1S2, RRR, no M/G/R; no carotid bruits; pedal pulses full and symmetric; no lower extremity edema  CHEST/BREAST: Breasts are symmetric in appearance; no palpable masses or lumps  GASTROINTESTINAL: No palpable masses or tenderness, +BS throughout, no rebound/guarding; no hepatosplenomegaly; no hernia palpated  adnexa without tenderness or mass  LYMPHATIC: No cervical LAD or tenderness; no axillary LAD or tenderness; no inguinal LAD or tenderness  MUSCULOSKELETAL: Normal gait and station; no digital clubbing or cyanosis; no paraspinal tenderness; examination of the  (head/neck, spine/ribs/pelvis, RUE, LUE, RLE, LLE) without misalignment, normal strength and tone of extremities  SKIN: Healing wound over R knee. No visible foot ulcers.  NEUROLOGIC: CN II-XII intact; 5/5 strength in bilateral extremities. Sensation intact to touch on plantar surface of feet. Mildly impaired great toe propioception. No tremors.  PSYCHIATRIC: A+O x 3; mood and affect appropriate; appropriate insight and judgment

## 2021-08-29 NOTE — ED CDU PROVIDER INITIAL DAY NOTE - MEDICAL DECISION MAKING DETAILS
34 yo M with PMH of Pre-DM, obesity, ETOH abuse, sent from urgent care to Er c/o hyperglycemia with Fs of 500 a/w fatigue a/w polyuria, polydipsia for 2 weeks. In ED, , anion gap 27, beta-hydroxy 2.0, hA1C 10.8, pH 7.39, Ua w/ small ketone; Pt given 3L NS, Amelog 10 units, Humulin R 5 units, Labrium 50mg. Pt sent to CDU for hyperglycemia, uncontrolled DM, Endocrine consult, and observation. 36 yo M with PMH of Pre-DM, obesity, ETOH abuse, sent from urgent care to Er c/o hyperglycemia with Fs of 500 a/w fatigue a/w polyuria, polydipsia for 2 weeks. In ED, , anion gap 27, beta-hydroxy 2.0, hA1C 10.8, pH 7.39, Ua w/ small ketone; Pt given 3L NS, Admelog 10 units, Humulin R 5 units, Labrium 50mg. Pt sent to CDU for hyperglycemia, uncontrolled DM, Endocrine consult, and observation.

## 2021-08-29 NOTE — CONSULT NOTE ADULT - SUBJECTIVE AND OBJECTIVE BOX
Endocrine New Consult  HPI:   Reports mild suprapubic pressure and urinary frequency. Denies dysuria. No flank pain. No cough or fever. No N, V or D. No chest pain or SOB. Drinks 6 beers daily. No hx of alcohol withdrawal or seizures. Last drink was yesterday.    34 yo M with PMH of Pre-DM, obesity, ETOH abuse presenting with hyperglycemia, FS 500s w/ 2 weeks of polyuria, polydipsia, fatigue. Drinks 6 beers daily, no hx of withdrawal. Last drink 8/26. Admitted for management of       In ED labs showing FS 500s, anion gap 27, pH normal, beta hydroxy 2.0. Now s/p 3L fluids, Humilin R 5 units pt sent to CDU. In CDU received maintenance fluids, Lantus 31 units at bedtime, Admelog 10 units with meals and sliding scale. Pt continues to have hyperglycemia, cbc w/ glucose 400 this morning, pH normal on rpt gas. Endocrine previously consulted, plan for admission. (29 Aug 2021 11:51)      PAST MEDICAL & SURGICAL HISTORY:  No pertinent past medical history    No significant past surgical history        FAMILY HISTORY:  No pertinent family history in first degree relatives        Social History:    Outpatient Medications:    MEDICATIONS  (STANDING):  dextrose 40% Gel 15 Gram(s) Oral once  dextrose 5%. 1000 milliLiter(s) (50 mL/Hr) IV Continuous <Continuous>  dextrose 5%. 1000 milliLiter(s) (100 mL/Hr) IV Continuous <Continuous>  dextrose 50% Injectable 25 Gram(s) IV Push once  dextrose 50% Injectable 12.5 Gram(s) IV Push once  dextrose 50% Injectable 25 Gram(s) IV Push once  glucagon  Injectable 1 milliGRAM(s) IntraMuscular once  insulin lispro (ADMELOG) corrective regimen sliding scale   SubCutaneous three times a day before meals  insulin lispro (ADMELOG) corrective regimen sliding scale   SubCutaneous at bedtime  insulin lispro Injectable (ADMELOG) 10 Unit(s) SubCutaneous three times a day before meals  sodium chloride 0.9%. 1000 milliLiter(s) (125 mL/Hr) IV Continuous <Continuous>    MEDICATIONS  (PRN):      Allergies    No Known Allergies    Intolerances      Review of Systems:  Constitutional: No fever  Eyes: No blurry vision  Neuro: No tremors  HEENT: No pain  Cardiovascular: No chest pain, palpitations  Respiratory: No SOB, no cough  GI: No nausea, vomiting, abdominal pain  : No dysuria  Skin: no rash  Psych: no depression  Endocrine: no polyuria, polydipsia  Hem/lymph: no swelling  Osteoporosis: no fractures    ALL OTHER SYSTEMS REVIEWED AND NEGATIVE    UNABLE TO OBTAIN    PHYSICAL EXAM:  VITALS: T(C): 36.8 (08-29-21 @ 10:15)  T(F): 98.2 (08-29-21 @ 10:15), Max: 99.5 (08-28-21 @ 14:46)  HR: 89 (08-29-21 @ 10:15) (82 - 109)  BP: 113/75 (08-29-21 @ 10:15) (113/75 - 144/80)  RR:  (16 - 18)  SpO2:  (96% - 100%)  Wt(kg): --  GENERAL: NAD, well-groomed, well-developed  EYES: No proptosis, no lid lag, anicteric  HEENT:  Atraumatic, Normocephalic, moist mucous membranes  THYROID: Normal size, no palpable nodules  RESPIRATORY: Clear to auscultation bilaterally; No rales, rhonchi, wheezing  CARDIOVASCULAR: Regular rate and rhythm; No murmurs; no peripheral edema  GI: Soft, nontender, non distended, normal bowel sounds  SKIN: Dry, intact, No rashes or lesions  MUSCULOSKELETAL: Full range of motion, normal strength  NEURO: sensation intact, extraocular movements intact, no tremor  PSYCH: Alert and oriented x 3, normal affect, normal mood  CUSHING'S SIGNS: no striae    POCT Blood Glucose.: 295 mg/dL (08-29-21 @ 12:13)  POCT Blood Glucose.: 336 mg/dL (08-29-21 @ 07:20)  POCT Blood Glucose.: 315 mg/dL (08-29-21 @ 00:56)  POCT Blood Glucose.: 335 mg/dL (08-28-21 @ 23:34)  POCT Blood Glucose.: 337 mg/dL (08-28-21 @ 22:11)  POCT Blood Glucose.: 358 mg/dL (08-28-21 @ 20:18)  POCT Blood Glucose.: 362 mg/dL (08-28-21 @ 19:51)  POCT Blood Glucose.: 452 mg/dL (08-28-21 @ 17:50)  POCT Blood Glucose.: 571 mg/dL (08-28-21 @ 14:51)                            15.0   5.97  )-----------( 234      ( 29 Aug 2021 07:50 )             43.8       08-29    133<L>  |  95<L>  |  9   ----------------------------<  367<H>  4.7   |  15<L>  |  0.82    EGFR if : 131  EGFR if non : 113    Ca    8.1<L>      08-29  Mg     2.10     08-28  Phos  3.9     08-28    TPro  TNP  /  Alb  4.4  /  TBili  0.5  /  DBili  x   /  AST  80<H>  /  ALT  50<H>  /  AlkPhos  92  08-28      Thyroid Function Tests:                Radiology:              Endocrine New Consult  HPI:  Reports mild suprapubic pressure and urinary frequency. Denies dysuria. No flank pain. No cough or fever. No N, V or D. No chest pain or SOB. Drinks 6 beers daily. No hx of alcohol withdrawal or seizures. Last drink was yesterday.    34 yo M with PMH of Pre-DM, obesity, ETOH abuse presenting with hyperglycemia, FS 500s w/ 2 weeks of polyuria, polydipsia, fatigue. Drinks 6 beers daily, no hx of withdrawal. Last drink 8/26. Admitted for management of       In ED labs showing FS 500s, anion gap 27, pH normal, beta hydroxy 2.0. Now s/p 3L fluids, Humilin R 5 units pt sent to CDU. In CDU received maintenance fluids, Lantus 31 units at bedtime, Admelog 10 units with meals and sliding scale. Pt continues to have hyperglycemia, cbc w/ glucose 400 this morning, pH normal on rpt gas. Endocrine previously consulted, plan for admission. (29 Aug 2021 11:51)      PAST MEDICAL & SURGICAL HISTORY:  No pertinent past medical history    No significant past surgical history        FAMILY HISTORY:  No pertinent family history in first degree relatives        Social History:    Outpatient Medications:    MEDICATIONS  (STANDING):  dextrose 40% Gel 15 Gram(s) Oral once  dextrose 5%. 1000 milliLiter(s) (50 mL/Hr) IV Continuous <Continuous>  dextrose 5%. 1000 milliLiter(s) (100 mL/Hr) IV Continuous <Continuous>  dextrose 50% Injectable 25 Gram(s) IV Push once  dextrose 50% Injectable 12.5 Gram(s) IV Push once  dextrose 50% Injectable 25 Gram(s) IV Push once  glucagon  Injectable 1 milliGRAM(s) IntraMuscular once  insulin lispro (ADMELOG) corrective regimen sliding scale   SubCutaneous three times a day before meals  insulin lispro (ADMELOG) corrective regimen sliding scale   SubCutaneous at bedtime  insulin lispro Injectable (ADMELOG) 10 Unit(s) SubCutaneous three times a day before meals  sodium chloride 0.9%. 1000 milliLiter(s) (125 mL/Hr) IV Continuous <Continuous>    MEDICATIONS  (PRN):      Allergies    No Known Allergies    Intolerances      Review of Systems:  Constitutional: No fever  Eyes: No blurry vision  Neuro: No tremors  HEENT: No pain  Cardiovascular: No chest pain, palpitations  Respiratory: No SOB, no cough  GI: No nausea, vomiting, abdominal pain  : No dysuria  Skin: no rash  Psych: no depression  Endocrine: no polyuria, polydipsia  Hem/lymph: no swelling  Osteoporosis: no fractures    ALL OTHER SYSTEMS REVIEWED AND NEGATIVE    UNABLE TO OBTAIN    PHYSICAL EXAM:  VITALS: T(C): 36.8 (08-29-21 @ 10:15)  T(F): 98.2 (08-29-21 @ 10:15), Max: 99.5 (08-28-21 @ 14:46)  HR: 89 (08-29-21 @ 10:15) (82 - 109)  BP: 113/75 (08-29-21 @ 10:15) (113/75 - 144/80)  RR:  (16 - 18)  SpO2:  (96% - 100%)  Wt(kg): --  GENERAL: NAD, well-groomed, well-developed  EYES: No proptosis, no lid lag, anicteric  HEENT:  Atraumatic, Normocephalic, moist mucous membranes  THYROID: Normal size, no palpable nodules  RESPIRATORY: Clear to auscultation bilaterally; No rales, rhonchi, wheezing  CARDIOVASCULAR: Regular rate and rhythm; No murmurs; no peripheral edema  GI: Soft, nontender, non distended, normal bowel sounds  SKIN: Dry, intact, No rashes or lesions  MUSCULOSKELETAL: Full range of motion, normal strength  NEURO: sensation intact, extraocular movements intact, no tremor  PSYCH: Alert and oriented x 3, normal affect, normal mood  CUSHING'S SIGNS: no striae    POCT Blood Glucose.: 295 mg/dL (08-29-21 @ 12:13)  POCT Blood Glucose.: 336 mg/dL (08-29-21 @ 07:20)  POCT Blood Glucose.: 315 mg/dL (08-29-21 @ 00:56)  POCT Blood Glucose.: 335 mg/dL (08-28-21 @ 23:34)  POCT Blood Glucose.: 337 mg/dL (08-28-21 @ 22:11)  POCT Blood Glucose.: 358 mg/dL (08-28-21 @ 20:18)  POCT Blood Glucose.: 362 mg/dL (08-28-21 @ 19:51)  POCT Blood Glucose.: 452 mg/dL (08-28-21 @ 17:50)  POCT Blood Glucose.: 571 mg/dL (08-28-21 @ 14:51)                            15.0   5.97  )-----------( 234      ( 29 Aug 2021 07:50 )             43.8       08-29    133<L>  |  95<L>  |  9   ----------------------------<  367<H>  4.7   |  15<L>  |  0.82    EGFR if : 131  EGFR if non : 113    Ca    8.1<L>      08-29  Mg     2.10     08-28  Phos  3.9     08-28    TPro  TNP  /  Alb  4.4  /  TBili  0.5  /  DBili  x   /  AST  80<H>  /  ALT  50<H>  /  AlkPhos  92  08-28      Thyroid Function Tests:                Radiology:              Endocrine New Consult  HPI:  34 yo M with PMH of Pre-DM, obesity, ETOH abuse presenting with hyperglycemia, FS 500s w/ 2 weeks of polyuria, polydipsia, fatigue, suprapubic pressure. Has not followed regularly with any PCP. Went to urgent care after feeling significant weakness, found to have sugars in 500s. Endocrine consulted for hyperglycemia in the setting of newly diagnosed uncontrolled DM2 w/ mild DKA.     ENDOCRINE HISTORY:   Patient reports that he was told that he was prediabetic around a year or so ago. Was not on any medications at home. Admits to poor diet.   Recently over the past couple of weeks he endorses feeling overall very fatigued and low energy. Has been feeling very thirsty as well and often finds himself urinating more than normal. He says initially he thought it was because he was drinking more but has been feeling like he is urinating in excess of that. Also feels urinary urgency and a lower abdominal/suprapubic pressure recently. No pain w/ urination or other changes to his urine that he has noticed. +Nocturia as well.   Patient has also recently noticed some mild blurry vision as well as numbness/tingling in his toes/feet.   During this time period, patient says that he has also felt aches/fatigue in his legs and body as if he does not have his usual strength. Just has not been feeling like he wants to do anything even when he has the desire to because he is so tired. States that he may have put on some weight as of recently.     PAST MEDICAL & SURGICAL HISTORY:  PreDM    No significant past surgical history    FAMILY HISTORY:  FH: type 2 diabetes (Mother)    Social History:  Lives with wife. Does not use tobacco/marijuana/vape. Drinks 6 beers a day, every day. (29 Aug 2021 11:51)    Home Medications:    MEDICATIONS  (STANDING):  dextrose 40% Gel 15 Gram(s) Oral once  dextrose 5%. 1000 milliLiter(s) (50 mL/Hr) IV Continuous <Continuous>  dextrose 5%. 1000 milliLiter(s) (100 mL/Hr) IV Continuous <Continuous>  dextrose 50% Injectable 25 Gram(s) IV Push once  dextrose 50% Injectable 12.5 Gram(s) IV Push once  dextrose 50% Injectable 25 Gram(s) IV Push once  glucagon  Injectable 1 milliGRAM(s) IntraMuscular once  insulin lispro (ADMELOG) corrective regimen sliding scale   SubCutaneous three times a day before meals  insulin lispro (ADMELOG) corrective regimen sliding scale   SubCutaneous at bedtime  insulin lispro Injectable (ADMELOG) 10 Unit(s) SubCutaneous three times a day before meals  sodium chloride 0.9%. 1000 milliLiter(s) (125 mL/Hr) IV Continuous <Continuous>    MEDICATIONS  (PRN):    Allergies  No Known Allergies  Intolerances    Review of Systems:  Constitutional: No fevers. chills, +fatigue   Eyes: +blurry vision  Neuro: No tremors, +numbness/tingling   HEENT: No pain  Cardiovascular: No chest pain, palpitations  Respiratory: No SOB, no cough  GI: No nausea, vomiting, abdominal pain, diarrhea, constipation   : No dysuria, +suprapubic pressure, +increased frequency/urgency  Skin: no rash  Psych: no depression  Endocrine: +polyuria, +polydipsia, no hot/cold intolerance   Hem/lymph: no swelling  Osteoporosis: no fractures  ALL OTHER SYSTEMS REVIEWED AND NEGATIVE    PHYSICAL EXAM:  VITALS: T(C): 36.8 (08-29-21 @ 10:15)  T(F): 98.2 (08-29-21 @ 10:15), Max: 99.5 (08-28-21 @ 14:46)  HR: 89 (08-29-21 @ 10:15) (82 - 109)  BP: 113/75 (08-29-21 @ 10:15) (113/75 - 144/80)  RR:  (16 - 18)  SpO2:  (96% - 100%)  Wt(kg): 148kg     GENERAL: NAD, well-groomed, well-developed, obese male   EYES: No proptosis, no lid lag, anicteric  HEENT:  Atraumatic, Normocephalic, moist mucous membranes  THYROID: Normal size, no palpable nodules, nontender   RESPIRATORY: Clear to auscultation bilaterally; No rales, rhonchi, wheezing  CARDIOVASCULAR: Regular rate and rhythm; No murmurs; no peripheral edema  GI: Soft, nontender, non distended, normal bowel sounds  SKIN: Dry, intact, No rashes or lesions  MUSCULOSKELETAL: Full range of motion, normal strength  NEURO: sensation intact, extraocular movements intact, no tremor  PSYCH: Alert and oriented x 3, reactive affect, euthymic mood   CUSHING'S SIGNS: no striae    POCT Blood Glucose.: 295 mg/dL (08-29-21 @ 12:13)  POCT Blood Glucose.: 336 mg/dL (08-29-21 @ 07:20)  POCT Blood Glucose.: 315 mg/dL (08-29-21 @ 00:56)  POCT Blood Glucose.: 335 mg/dL (08-28-21 @ 23:34)  POCT Blood Glucose.: 337 mg/dL (08-28-21 @ 22:11)  POCT Blood Glucose.: 358 mg/dL (08-28-21 @ 20:18)  POCT Blood Glucose.: 362 mg/dL (08-28-21 @ 19:51)  POCT Blood Glucose.: 452 mg/dL (08-28-21 @ 17:50)  POCT Blood Glucose.: 571 mg/dL (08-28-21 @ 14:51)                        15.0   5.97  )-----------( 234      ( 29 Aug 2021 07:50 )             43.8       08-29    133<L>  |  95<L>  |  9   ----------------------------<  367<H>  4.7   |  15<L>  |  0.82    EGFR if : 131  EGFR if non : 113    Ca    8.1<L>      08-29  Mg     2.10     08-28  Phos  3.9     08-28    TPro  TNP  /  Alb  4.4  /  TBili  0.5  /  DBili  x   /  AST  80<H>  /  ALT  50<H>  /  AlkPhos  92  08-28      Thyroid Function Tests:      Radiology:

## 2021-08-30 ENCOUNTER — TRANSCRIPTION ENCOUNTER (OUTPATIENT)
Age: 36
End: 2021-08-30

## 2021-08-30 DIAGNOSIS — E78.1 PURE HYPERGLYCERIDEMIA: ICD-10-CM

## 2021-08-30 LAB
ALBUMIN SERPL ELPH-MCNC: 3.6 G/DL — SIGNIFICANT CHANGE UP (ref 3.3–5)
ALP SERPL-CCNC: 78 U/L — SIGNIFICANT CHANGE UP (ref 40–120)
ALT FLD-CCNC: 39 U/L — SIGNIFICANT CHANGE UP (ref 4–41)
ANION GAP SERPL CALC-SCNC: 15 MMOL/L — HIGH (ref 7–14)
ANION GAP SERPL CALC-SCNC: 15 MMOL/L — HIGH (ref 7–14)
ANION GAP SERPL CALC-SCNC: 16 MMOL/L — HIGH (ref 7–14)
ANION GAP SERPL CALC-SCNC: 18 MMOL/L — HIGH (ref 7–14)
ANION GAP SERPL CALC-SCNC: 19 MMOL/L — HIGH (ref 7–14)
ANION GAP SERPL CALC-SCNC: 29 MMOL/L — HIGH (ref 7–14)
AST SERPL-CCNC: 43 U/L — HIGH (ref 4–40)
B-OH-BUTYR SERPL-SCNC: 2.2 MMOL/L — HIGH (ref 0–0.4)
BASE EXCESS BLDV CALC-SCNC: -0.8 MMOL/L — SIGNIFICANT CHANGE UP (ref -2–3)
BASE EXCESS BLDV CALC-SCNC: -0.8 MMOL/L — SIGNIFICANT CHANGE UP (ref -2–3)
BASE EXCESS BLDV CALC-SCNC: 0.5 MMOL/L — SIGNIFICANT CHANGE UP (ref -2–3)
BILIRUB DIRECT SERPL-MCNC: <0.2 MG/DL — SIGNIFICANT CHANGE UP (ref 0–0.2)
BILIRUB INDIRECT FLD-MCNC: >0.1 MG/DL — SIGNIFICANT CHANGE UP (ref 0–1)
BILIRUB SERPL-MCNC: 0.3 MG/DL — SIGNIFICANT CHANGE UP (ref 0.2–1.2)
BLOOD GAS VENOUS COMPREHENSIVE RESULT: SIGNIFICANT CHANGE UP
BUN SERPL-MCNC: 10 MG/DL — SIGNIFICANT CHANGE UP (ref 7–23)
BUN SERPL-MCNC: 10 MG/DL — SIGNIFICANT CHANGE UP (ref 7–23)
BUN SERPL-MCNC: 7 MG/DL — SIGNIFICANT CHANGE UP (ref 7–23)
BUN SERPL-MCNC: 8 MG/DL — SIGNIFICANT CHANGE UP (ref 7–23)
CALCIUM SERPL-MCNC: 8.4 MG/DL — SIGNIFICANT CHANGE UP (ref 8.4–10.5)
CALCIUM SERPL-MCNC: 8.5 MG/DL — SIGNIFICANT CHANGE UP (ref 8.4–10.5)
CALCIUM SERPL-MCNC: 8.5 MG/DL — SIGNIFICANT CHANGE UP (ref 8.4–10.5)
CALCIUM SERPL-MCNC: 8.6 MG/DL — SIGNIFICANT CHANGE UP (ref 8.4–10.5)
CHLORIDE BLDV-SCNC: 103 MMOL/L — SIGNIFICANT CHANGE UP (ref 96–108)
CHLORIDE BLDV-SCNC: 104 MMOL/L — SIGNIFICANT CHANGE UP (ref 96–108)
CHLORIDE BLDV-SCNC: 105 MMOL/L — SIGNIFICANT CHANGE UP (ref 96–108)
CHLORIDE SERPL-SCNC: 100 MMOL/L — SIGNIFICANT CHANGE UP (ref 98–107)
CHLORIDE SERPL-SCNC: 101 MMOL/L — SIGNIFICANT CHANGE UP (ref 98–107)
CHLORIDE SERPL-SCNC: 95 MMOL/L — LOW (ref 98–107)
CHLORIDE SERPL-SCNC: 95 MMOL/L — LOW (ref 98–107)
CHLORIDE SERPL-SCNC: 97 MMOL/L — LOW (ref 98–107)
CHLORIDE SERPL-SCNC: 97 MMOL/L — LOW (ref 98–107)
CHOLEST SERPL-MCNC: 351 MG/DL — HIGH
CO2 BLDV-SCNC: 27.3 MMOL/L — HIGH (ref 22–26)
CO2 BLDV-SCNC: 29 MMOL/L — HIGH (ref 22–26)
CO2 BLDV-SCNC: 29.1 MMOL/L — HIGH (ref 22–26)
CO2 SERPL-SCNC: 17 MMOL/L — LOW (ref 22–31)
CO2 SERPL-SCNC: 18 MMOL/L — LOW (ref 22–31)
CO2 SERPL-SCNC: 19 MMOL/L — LOW (ref 22–31)
CO2 SERPL-SCNC: 20 MMOL/L — LOW (ref 22–31)
CO2 SERPL-SCNC: 20 MMOL/L — LOW (ref 22–31)
CO2 SERPL-SCNC: 21 MMOL/L — LOW (ref 22–31)
COVID-19 SPIKE DOMAIN AB INTERP: POSITIVE
COVID-19 SPIKE DOMAIN ANTIBODY RESULT: >250 U/ML — HIGH
CREAT SERPL-MCNC: 0.69 MG/DL — SIGNIFICANT CHANGE UP (ref 0.5–1.3)
CREAT SERPL-MCNC: 0.72 MG/DL — SIGNIFICANT CHANGE UP (ref 0.5–1.3)
CREAT SERPL-MCNC: 0.73 MG/DL — SIGNIFICANT CHANGE UP (ref 0.5–1.3)
CREAT SERPL-MCNC: 0.76 MG/DL — SIGNIFICANT CHANGE UP (ref 0.5–1.3)
CREAT SERPL-MCNC: 0.77 MG/DL — SIGNIFICANT CHANGE UP (ref 0.5–1.3)
CREAT SERPL-MCNC: 0.8 MG/DL — SIGNIFICANT CHANGE UP (ref 0.5–1.3)
GAS PNL BLDV: 135 MMOL/L — LOW (ref 136–145)
GAS PNL BLDV: 136 MMOL/L — SIGNIFICANT CHANGE UP (ref 136–145)
GAS PNL BLDV: 137 MMOL/L — SIGNIFICANT CHANGE UP (ref 136–145)
GAS PNL BLDV: SIGNIFICANT CHANGE UP
GLUCOSE BLDC GLUCOMTR-MCNC: 161 MG/DL — HIGH (ref 70–99)
GLUCOSE BLDC GLUCOMTR-MCNC: 174 MG/DL — HIGH (ref 70–99)
GLUCOSE BLDC GLUCOMTR-MCNC: 202 MG/DL — HIGH (ref 70–99)
GLUCOSE BLDC GLUCOMTR-MCNC: 213 MG/DL — HIGH (ref 70–99)
GLUCOSE BLDC GLUCOMTR-MCNC: 229 MG/DL — HIGH (ref 70–99)
GLUCOSE BLDC GLUCOMTR-MCNC: 233 MG/DL — HIGH (ref 70–99)
GLUCOSE BLDV-MCNC: 183 MG/DL — HIGH (ref 70–99)
GLUCOSE BLDV-MCNC: 183 MG/DL — HIGH (ref 70–99)
GLUCOSE BLDV-MCNC: 259 MG/DL — HIGH (ref 70–99)
GLUCOSE SERPL-MCNC: 169 MG/DL — HIGH (ref 70–99)
GLUCOSE SERPL-MCNC: 175 MG/DL — HIGH (ref 70–99)
GLUCOSE SERPL-MCNC: 212 MG/DL — HIGH (ref 70–99)
GLUCOSE SERPL-MCNC: 235 MG/DL — HIGH (ref 70–99)
GLUCOSE SERPL-MCNC: 238 MG/DL — HIGH (ref 70–99)
GLUCOSE SERPL-MCNC: 240 MG/DL — HIGH (ref 70–99)
HCO3 BLDV-SCNC: 26 MMOL/L — SIGNIFICANT CHANGE UP (ref 22–29)
HCO3 BLDV-SCNC: 27 MMOL/L — SIGNIFICANT CHANGE UP (ref 22–29)
HCO3 BLDV-SCNC: 27 MMOL/L — SIGNIFICANT CHANGE UP (ref 22–29)
HCT VFR BLD CALC: 42.7 % — SIGNIFICANT CHANGE UP (ref 39–50)
HCT VFR BLDA CALC: 42 % — SIGNIFICANT CHANGE UP (ref 39–51)
HCT VFR BLDA CALC: 45 % — SIGNIFICANT CHANGE UP (ref 39–51)
HCT VFR BLDA CALC: 45 % — SIGNIFICANT CHANGE UP (ref 39–51)
HDLC SERPL-MCNC: 14 MG/DL — LOW
HGB BLD CALC-MCNC: 13.9 G/DL — SIGNIFICANT CHANGE UP (ref 13–17)
HGB BLD CALC-MCNC: 15 G/DL — SIGNIFICANT CHANGE UP (ref 13–17)
HGB BLD CALC-MCNC: 15.1 G/DL — SIGNIFICANT CHANGE UP (ref 13–17)
HGB BLD-MCNC: 14.3 G/DL — SIGNIFICANT CHANGE UP (ref 13–17)
LACTATE BLDV-MCNC: 1.5 MMOL/L — SIGNIFICANT CHANGE UP (ref 0.5–2)
LDLC SERPL DIRECT ASSAY-MCNC: 53 MG/DL — LOW (ref 73–160)
LIPID PNL WITH DIRECT LDL SERPL: SIGNIFICANT CHANGE UP MG/DL
MAGNESIUM SERPL-MCNC: 1.8 MG/DL — SIGNIFICANT CHANGE UP (ref 1.6–2.6)
MAGNESIUM SERPL-MCNC: 1.9 MG/DL — SIGNIFICANT CHANGE UP (ref 1.6–2.6)
MAGNESIUM SERPL-MCNC: 1.9 MG/DL — SIGNIFICANT CHANGE UP (ref 1.6–2.6)
MAGNESIUM SERPL-MCNC: 2 MG/DL — SIGNIFICANT CHANGE UP (ref 1.6–2.6)
MAGNESIUM SERPL-MCNC: 2 MG/DL — SIGNIFICANT CHANGE UP (ref 1.6–2.6)
MAGNESIUM SERPL-MCNC: 2.1 MG/DL — SIGNIFICANT CHANGE UP (ref 1.6–2.6)
MCHC RBC-ENTMCNC: 29 PG — SIGNIFICANT CHANGE UP (ref 27–34)
MCHC RBC-ENTMCNC: 33.5 GM/DL — SIGNIFICANT CHANGE UP (ref 32–36)
MCV RBC AUTO: 86.6 FL — SIGNIFICANT CHANGE UP (ref 80–100)
NON HDL CHOLESTEROL: 337 MG/DL — HIGH
NRBC # BLD: 0 /100 WBCS — SIGNIFICANT CHANGE UP
NRBC # FLD: 0 K/UL — SIGNIFICANT CHANGE UP
PCO2 BLDV: 49 MMHG — SIGNIFICANT CHANGE UP (ref 42–55)
PCO2 BLDV: 52 MMHG — SIGNIFICANT CHANGE UP (ref 42–55)
PCO2 BLDV: 58 MMHG — HIGH (ref 42–55)
PH BLDV: 7.28 — LOW (ref 7.32–7.43)
PH BLDV: 7.33 — SIGNIFICANT CHANGE UP (ref 7.32–7.43)
PH BLDV: 7.33 — SIGNIFICANT CHANGE UP (ref 7.32–7.43)
PHOSPHATE SERPL-MCNC: 2.5 MG/DL — SIGNIFICANT CHANGE UP (ref 2.5–4.5)
PHOSPHATE SERPL-MCNC: 2.7 MG/DL — SIGNIFICANT CHANGE UP (ref 2.5–4.5)
PHOSPHATE SERPL-MCNC: 2.8 MG/DL — SIGNIFICANT CHANGE UP (ref 2.5–4.5)
PHOSPHATE SERPL-MCNC: 3.1 MG/DL — SIGNIFICANT CHANGE UP (ref 2.5–4.5)
PHOSPHATE SERPL-MCNC: 3.4 MG/DL — SIGNIFICANT CHANGE UP (ref 2.5–4.5)
PHOSPHATE SERPL-MCNC: 3.5 MG/DL — SIGNIFICANT CHANGE UP (ref 2.5–4.5)
PLATELET # BLD AUTO: 212 K/UL — SIGNIFICANT CHANGE UP (ref 150–400)
PO2 BLDV: 33 MMHG — SIGNIFICANT CHANGE UP
PO2 BLDV: 55 MMHG — SIGNIFICANT CHANGE UP
PO2 BLDV: 56 MMHG — SIGNIFICANT CHANGE UP
POTASSIUM BLDV-SCNC: 3.7 MMOL/L — SIGNIFICANT CHANGE UP (ref 3.5–5.1)
POTASSIUM BLDV-SCNC: 3.9 MMOL/L — SIGNIFICANT CHANGE UP (ref 3.5–5.1)
POTASSIUM BLDV-SCNC: 4.4 MMOL/L — SIGNIFICANT CHANGE UP (ref 3.5–5.1)
POTASSIUM SERPL-MCNC: 3.8 MMOL/L — SIGNIFICANT CHANGE UP (ref 3.5–5.3)
POTASSIUM SERPL-MCNC: 4 MMOL/L — SIGNIFICANT CHANGE UP (ref 3.5–5.3)
POTASSIUM SERPL-MCNC: 4.5 MMOL/L — SIGNIFICANT CHANGE UP (ref 3.5–5.3)
POTASSIUM SERPL-MCNC: 4.6 MMOL/L — SIGNIFICANT CHANGE UP (ref 3.5–5.3)
POTASSIUM SERPL-MCNC: 4.7 MMOL/L — SIGNIFICANT CHANGE UP (ref 3.5–5.3)
POTASSIUM SERPL-MCNC: 5.1 MMOL/L — SIGNIFICANT CHANGE UP (ref 3.5–5.3)
POTASSIUM SERPL-SCNC: 3.8 MMOL/L — SIGNIFICANT CHANGE UP (ref 3.5–5.3)
POTASSIUM SERPL-SCNC: 4 MMOL/L — SIGNIFICANT CHANGE UP (ref 3.5–5.3)
POTASSIUM SERPL-SCNC: 4.5 MMOL/L — SIGNIFICANT CHANGE UP (ref 3.5–5.3)
POTASSIUM SERPL-SCNC: 4.6 MMOL/L — SIGNIFICANT CHANGE UP (ref 3.5–5.3)
POTASSIUM SERPL-SCNC: 4.7 MMOL/L — SIGNIFICANT CHANGE UP (ref 3.5–5.3)
POTASSIUM SERPL-SCNC: 5.1 MMOL/L — SIGNIFICANT CHANGE UP (ref 3.5–5.3)
PROT SERPL-MCNC: 6.6 G/DL — SIGNIFICANT CHANGE UP (ref 6–8.3)
RBC # BLD: 4.93 M/UL — SIGNIFICANT CHANGE UP (ref 4.2–5.8)
RBC # FLD: 13 % — SIGNIFICANT CHANGE UP (ref 10.3–14.5)
SAO2 % BLDV: 57 % — SIGNIFICANT CHANGE UP
SAO2 % BLDV: 84.5 % — SIGNIFICANT CHANGE UP
SAO2 % BLDV: 85.2 % — SIGNIFICANT CHANGE UP
SARS-COV-2 IGG+IGM SERPL QL IA: >250 U/ML — HIGH
SARS-COV-2 IGG+IGM SERPL QL IA: POSITIVE
SODIUM SERPL-SCNC: 132 MMOL/L — LOW (ref 135–145)
SODIUM SERPL-SCNC: 133 MMOL/L — LOW (ref 135–145)
SODIUM SERPL-SCNC: 133 MMOL/L — LOW (ref 135–145)
SODIUM SERPL-SCNC: 135 MMOL/L — SIGNIFICANT CHANGE UP (ref 135–145)
SODIUM SERPL-SCNC: 137 MMOL/L — SIGNIFICANT CHANGE UP (ref 135–145)
SODIUM SERPL-SCNC: 142 MMOL/L — SIGNIFICANT CHANGE UP (ref 135–145)
TRIGL SERPL-MCNC: 1726 MG/DL — HIGH
WBC # BLD: 5.35 K/UL — SIGNIFICANT CHANGE UP (ref 3.8–10.5)
WBC # FLD AUTO: 5.35 K/UL — SIGNIFICANT CHANGE UP (ref 3.8–10.5)

## 2021-08-30 PROCEDURE — 99232 SBSQ HOSP IP/OBS MODERATE 35: CPT

## 2021-08-30 PROCEDURE — 99233 SBSQ HOSP IP/OBS HIGH 50: CPT | Mod: GC

## 2021-08-30 RX ORDER — INSULIN LISPRO 100/ML
VIAL (ML) SUBCUTANEOUS EVERY 4 HOURS
Refills: 0 | Status: DISCONTINUED | OUTPATIENT
Start: 2021-08-30 | End: 2021-08-31

## 2021-08-30 RX ORDER — ISOPROPYL ALCOHOL, BENZOCAINE .7; .06 ML/ML; ML/ML
1 SWAB TOPICAL
Qty: 100 | Refills: 1
Start: 2021-08-30 | End: 2021-10-18

## 2021-08-30 RX ORDER — SODIUM CHLORIDE 9 MG/ML
1000 INJECTION INTRAMUSCULAR; INTRAVENOUS; SUBCUTANEOUS
Refills: 0 | Status: DISCONTINUED | OUTPATIENT
Start: 2021-08-30 | End: 2021-08-31

## 2021-08-30 RX ORDER — POTASSIUM CHLORIDE 20 MEQ
40 PACKET (EA) ORAL EVERY 4 HOURS
Refills: 0 | Status: COMPLETED | OUTPATIENT
Start: 2021-08-30 | End: 2021-08-31

## 2021-08-30 RX ORDER — INSULIN GLARGINE 100 [IU]/ML
46 INJECTION, SOLUTION SUBCUTANEOUS AT BEDTIME
Refills: 0 | Status: DISCONTINUED | OUTPATIENT
Start: 2021-08-30 | End: 2021-09-01

## 2021-08-30 RX ORDER — ATORVASTATIN CALCIUM 80 MG/1
40 TABLET, FILM COATED ORAL AT BEDTIME
Refills: 0 | Status: DISCONTINUED | OUTPATIENT
Start: 2021-08-30 | End: 2021-08-31

## 2021-08-30 RX ORDER — HUMAN INSULIN 100 [IU]/ML
6 INJECTION, SUSPENSION SUBCUTANEOUS ONCE
Refills: 0 | Status: COMPLETED | OUTPATIENT
Start: 2021-08-30 | End: 2021-08-30

## 2021-08-30 RX ADMIN — Medication 4: at 14:05

## 2021-08-30 RX ADMIN — Medication 2: at 18:12

## 2021-08-30 RX ADMIN — ENOXAPARIN SODIUM 40 MILLIGRAM(S): 100 INJECTION SUBCUTANEOUS at 14:04

## 2021-08-30 RX ADMIN — ATORVASTATIN CALCIUM 40 MILLIGRAM(S): 80 TABLET, FILM COATED ORAL at 22:28

## 2021-08-30 RX ADMIN — HUMAN INSULIN 6 UNIT(S): 100 INJECTION, SUSPENSION SUBCUTANEOUS at 14:04

## 2021-08-30 RX ADMIN — Medication 7 UNIT(S): at 06:55

## 2021-08-30 RX ADMIN — Medication 7 UNIT(S): at 03:36

## 2021-08-30 RX ADMIN — Medication 7 UNIT(S): at 10:03

## 2021-08-30 RX ADMIN — INSULIN GLARGINE 46 UNIT(S): 100 INJECTION, SOLUTION SUBCUTANEOUS at 22:20

## 2021-08-30 RX ADMIN — Medication 40 MILLIEQUIVALENT(S): at 22:28

## 2021-08-30 RX ADMIN — Medication 2: at 22:20

## 2021-08-30 RX ADMIN — SODIUM CHLORIDE 125 MILLILITER(S): 9 INJECTION INTRAMUSCULAR; INTRAVENOUS; SUBCUTANEOUS at 10:31

## 2021-08-30 NOTE — PROGRESS NOTE ADULT - ASSESSMENT
36 yo M with PMH of Pre-DM, obesity, ETOH abuse presenting with hyperglycemia, FS 500s w/ 2 weeks of polyuria, polydipsia, fatigue, suprapubic pressure. Has not followed regularly with any PCP. Went to urgent care after feeling significant weakness, found to have sugars in 500s. Endocrine consulted for hyperglycemia in the setting of newly diagnosed uncontrolled DM2 w/ mild DKA.    Mild DKA  New DM2, Uncontrolled   A1c 10.8%   + polyuria, polydypsia, numbness/tingling in feet, blurry vision, fatigue   Recommendations:   - Trend BMP/BHB/VBG [id number]   - NPO until DKA resolved, AG < 14, Bicarb > 18  - IVF Hydration   - FS BG Monitoring d7mgcit until resolution of DKA >> change to tidac/premeal & bedtime once dka resolved & po diet resumed   - If Blood glucose less then 200mg/dL, start Dextrose containing IVF and continue insulin treatment until DKA resolves  - Increase Lantus to 46 units SC QHS  - NPH 6 units x1 given   - Change to moderate correction scale q 4 hours  - Once DKA resolved, AG < 14 and Bicarb > 18:   ·	Start Admelog 7 units SC TIDAC/Premeal  ·	Restart Carb Consistent Diet   ·	Moderate Correction Scale tidac/premeal once dka resolved & po diet resumed   ·	Continue Lantus 46 units SC QHS   - Hypoglycemia protocol   - Nutrition consult   - Diabetes/Insulin PEN Teaching     DC Planning: likely on basal/bolus regimen vs basal/oral depending on patient acceptance. Doses/DC regimen to be determined. Patient will need nutrition consult, diabetes/insulin teaching prior to discharge. Please send Test RX for basal insulin pen (ie. Basaglar, Lantus, Tresiba, Toujeo, Levemir) and bolus insulin pen(ie. humalog/novolog/admelog) depending on insurance coverage to see which is covered. Please also send prescriptions for glucometer, test strips, lancets, alcohol swabs. Patient should follow up with opthalmology & podiatry after discharge. Patient can follow up with endocrine at the location provided below:     [facility]   [address] [place] [address] [address]  [facility] [address]  [phone]    HTN  Recommendations:   - BP goal < 130/80   - outpatient microalb/cr ratio, consider starting Acei/Arb if > 30 and no other contraindications     HLD  Recommendations:   - LDL goal < 70   - Fasting lipid profile   - Consider starting statin if above goal     Obesity   Recommendations:   - Encouraged weight loss & lifestyle changes including dietary modifications & exercise.   - consider outpatient obesity medicine referral     Lamar Tapia  Nurse Practitioner  Division of Endocrinology & Diabetes  Pager # [phone]      If after 6PM or before 9AM, or on weekends/holidays, please call endocrine answering service for assistance ([phone]).  For nonurgent matters email [email or web address] for assistance. 34 yo M with PMH of Pre-DM, obesity, ETOH abuse presenting with hyperglycemia, FS 500s w/ 2 weeks of polyuria, polydipsia, fatigue, suprapubic pressure. Has not followed regularly with any PCP. Went to urgent care after feeling significant weakness, found to have sugars in 500s. Endocrine consulted for hyperglycemia in the setting of newly diagnosed uncontrolled DM2 w/ mild DKA.    Mild DKA  New DM2, Uncontrolled   A1c 10.8%   + polyuria, polydypsia, numbness/tingling in feet, blurry vision, fatigue   Recommendations:   - Trend BMP/BHB/VBG [id number]   - NPO until DKA resolved, AG < 14, Bicarb > 18  - IVF Hydration   - FS BG Monitoring [id number] until resolution of DKA >> change to tidac/premeal & bedtime once dka resolved & po diet resumed   - If Blood glucose less then 200mg/dL, start Dextrose containing IVF and continue insulin treatment until DKA resolves  - Increase Lantus to 46 units SC QHS  - NPH 6 units x1 given   - Change to moderate correction scale q 4 hours  - Once DKA resolved, AG < 14 and Bicarb > 18:   ·	Start Admelog 7 units SC TIDAC/Premeal  ·	Restart Carb Consistent Diet   ·	Moderate Correction Scale tidac/premeal once dka resolved & po diet resumed   ·	Continue Lantus 46 units SC QHS   - Hypoglycemia protocol   - Nutrition consult   - Diabetes/Insulin PEN Teaching     DC Planning: likely on basal/bolus regimen vs basal/oral depending on patient acceptance. Doses/DC regimen to be determined. Patient will need nutrition consult, diabetes/insulin teaching prior to discharge. Please send Test RX for basal insulin pen (ie. Basaglar, Lantus, Tresiba, Toujeo, Levemir) and bolus insulin pen(ie. humalog/novolog/admelog) depending on insurance coverage to see which is covered. Please also send prescriptions for glucometer, test strips, lancets, alcohol swabs. Patient should follow up with opthalmology & podiatry after discharge. Patient can follow up with endocrine at the location provided below:     [facility]   [address] [place] [address] [address]  [facility] [address]  [phone]    HTN  Recommendations:   - BP goal < 130/80   - outpatient microalb/cr ratio, consider starting Acei/Arb if > 30 and no other contraindications     HLD  Recommendations:   - LDL goal < 70   - Continue  statin     HTG  - triglycerides > 1700  - Start fenofibrate 145 mg po daily  - Start lovaza 2 grams po BID    Obesity   Recommendations:   - Encouraged weight loss & lifestyle changes including dietary modifications & exercise.   - consider outpatient obesity medicine referral     Lamar Tapia  Nurse Practitioner  Division of Endocrinology & Diabetes  Pager # [phone]      If after 6PM or before 9AM, or on weekends/holidays, please call endocrine answering service for assistance ([phone]).  For nonurgent matters email [email or web address] for assistance.

## 2021-08-30 NOTE — DISCHARGE NOTE PROVIDER - NSDCMRMEDTOKEN_GEN_ALL_CORE_FT
alcohol swabs : Apply topically to affected area 4 times a day   atorvastatin 20 mg oral tablet: 1 tab(s) orally once a day (at bedtime)  Basaglar KwikPen 100 units/mL subcutaneous solution: 46 unit(s) subcutaneous once a day at bedtime  fenofibrate 145 mg oral tablet: 1 tab(s) orally once a day  glucometer (per patient&#x27;s insurance): Test blood sugars four times a day. Dispense #1 glucometer.  HumaLOG KwikPen 100 units/mL injectable solution: 6 unit(s) subcutaneous 3 times a day (with meals)   Insulin Pen Needles, 4mm: 1 application subcutaneously 4 times a day. ** Use with insulin pen **   lancets: 1 application subcutaneously 4 times a day   omega-3 polyunsaturated fatty acids ethyl esters 1000 mg oral capsule: 2 cap(s) orally 2 times a day  test strips (per patient&#x27;s insurance): 1 application subcutaneously 4 times a day. ** Compatible with patient&#x27;s glucometer **

## 2021-08-30 NOTE — DISCHARGE NOTE PROVIDER - CARE PROVIDER_API CALL
Analilia Huddleston)  Internal Medicine  [address], [place] [address]  Phone: [phone]  Fax: [phone]  Follow Up Time:

## 2021-08-30 NOTE — DISCHARGE NOTE PROVIDER - NSDCCPCAREPLAN_GEN_ALL_CORE_FT
PRINCIPAL DISCHARGE DIAGNOSIS  Diagnosis: Diabetes mellitus, new onset  Assessment and Plan of Treatment: You came to the emergency room because you having fatigue and weakness. Blood tests showed that your sugars were very elevated and that you were extremely dehydrated due to diabetes. You were diagnosed with DiabeticKetoacidosis which is a condition that can happen with untreated or improperly treated diabetes, or when someone who has diabetes has an infection. You were given a lot of insulin and intravenous fluids to  help bring down your sugars. It took some time but your sugars are better controlled now on an insulin regiment.   You will need to continue to take insulin (short acting Humalog 6 units three times a day before meals, long acting lantus 46 units at bedtime), and make sure not to skip meals, as that can cause your blood sugar to drop dangerously low. You will also need to check your blood sugar in the morning before you eat and before lunch, dinner, and bedtime. Record these numbers in a notebook and bring the notebook to all your doctor visits so that the doctor can use the numbers to adjust your insulin. If you feel shaky, lightheaded, or sweaty, take your sugar. If it is lower than 70 that means your sugar is too low and you should eat something that has sugar in it (orange juice or candy) and call your doctor.   Please follow up with your primary care doctor within 2 weeks and make an appointment to see endocrinology doctors as soon as possible.      SECONDARY DISCHARGE DIAGNOSES  Diagnosis: Isolated hypertriglyceridemia  Assessment and Plan of Treatment: You were found to have very very high triglycerides, a kind of fat in the body. It is at such high levels that it puts you at risk of heart disease and pancreatitis (a dangerous inflammation of the pancrease in your belly). You will need to take atorvastatin, fenofibrate, and lovaza to help bring this level down to safer and more normal levels.

## 2021-08-30 NOTE — DISCHARGE NOTE PROVIDER - HOSPITAL COURSE
HPI:  34 yo M with PMH of Pre-DM, obesity, ETOH abuse presenting with hyperglycemia, FS 500s w/ 2 weeks of polyuria, polydipsia, fatigue. Has not followed regularly with any PCP. Went to urgent care after feeling significant weakness, found to have sugars in 500s. Additionally, drinks 6 beers daily. Tried to quit around a year ago but felt tachycardic and uncomfortable so resumed drinking. Has never had a seizure. Last drink 8/26. Also drinking alot of sodas in past few weeks as he felt very thirsty. Pt feels very motivated to quit drinking and get sugars under control this admission. Also reports mild suprapubic pressure and urinary frequency. Pt has noticed small wounds do not heal very fast. Also feels like he has mild leg edema, pain/numbness in his feet.    In ED labs showing FS 500s, anion gap 27, pH normal, beta hydroxy 2.0. UA noninfectious. Now s/p 3L fluids, Humilin R 5 units pt sent to CDU. In CDU received maintenance fluids, Lantus 31 units at bedtime, Admelog 10 units with meals and sliding scale. HPI:  34 yo M with PMH of Pre-DM, obesity, ETOH abuse presenting with hyperglycemia, FS 500s w/ 2 weeks of polyuria, polydipsia, fatigue. Has not followed regularly with any PCP. Went to urgent care after feeling significant weakness, found to have sugars in 500s. Additionally, drinks 6 beers daily. Tried to quit around a year ago but felt tachycardic and uncomfortable so resumed drinking. Has never had a seizure. Last drink 8/26. Also drinking alot of sodas in past few weeks as he felt very thirsty. Pt feels very motivated to quit drinking and get sugars under control this admission. Also reports mild suprapubic pressure and urinary frequency. Pt has noticed small wounds do not heal very fast. Also feels like he has mild leg edema, pain/numbness in his feet.    In ED labs showing FS 500s, anion gap 27, pH normal, beta hydroxy 2.0. UA noninfectious. Now s/p 3L fluids, Humilin R 5 units pt sent to CDU. In CDU received maintenance fluids, Lantus 31 units at bedtime, Admelog 10 units with meals and sliding scale.     Hospital Course: HPI:  36 yo M with PMH of Pre-DM, obesity, ETOH abuse presenting with hyperglycemia, FS 500s w/ 2 weeks of polyuria, polydipsia, fatigue. Has not followed regularly with any PCP. Went to urgent care after feeling significant weakness, found to have sugars in 500s. Additionally, drinks 6 beers daily. Tried to quit around a year ago but felt tachycardic and uncomfortable so resumed drinking. Has never had a seizure. Last drink 8/26. Also drinking alot of sodas in past few weeks as he felt very thirsty. Pt feels very motivated to quit drinking and get sugars under control this admission. Also reports mild suprapubic pressure and urinary frequency. Pt has noticed small wounds do not heal very fast. Also feels like he has mild leg edema, pain/numbness in his feet.    In ED labs showing FS 500s, anion gap 27, pH normal, beta hydroxy 2.0. UA noninfectious. Now s/p 3L fluids, Humilin R 5 units pt sent to CDU. In CDU received maintenance fluids, Lantus 31 units at bedtime, Admelog 10 units with meals and sliding scale.     Hospital Course:  Pt was admitted to floor with subcutaneous insulin injections to treat miild DKA. Endocrinology was consulted to aid in titrating the insulin regimen. Pt was also maintained on IV fluids and potassium repletion. Pt's blood sugars downtrended, Beta-hydroxybutyrate downtrended, acidosis resolved, and anion gap closed. Patient's blood sugars are now in goal range in 180s-200s. Nutrition was also consulted for additional support. Pt was also found to have hypercholesterolemia and hypertriglyceridemia, and was started on atorvastatin, fenofibrate, and lovaza. Since patient had abruptly stopped drinking alcohol, CIWAs were monitored on the patient, but patient was negative for withdrawal symptoms. Patient now feels back to his baseline energy and physical status, eating without issues. He is highly motivated to quit drinking and to eat healthier. He is medically stable for discharge and will go home on a regimen of basaglar basal and humalog bolus insulin for his DM2. Diabetes teaching was performed prior to discharge.

## 2021-08-30 NOTE — PROGRESS NOTE ADULT - SUBJECTIVE AND OBJECTIVE BOX
***************************************************************  Rosamaria Moody, PGY1  Internal Medicine   pager: AGUSTINA: [phone], [facility]: [phone]  ***************************************************************    ALEXANDER GOODRICH  35y  MRN: [id number]    Patient is a 35y old  Male who presents with a chief complaint of hyperglycemia (29 Aug 2021 12:36)      Subjective: no events ON. Denies fever, CP, SOB, abn pain, N/V, dysuria. Tolerating diet.      MEDICATIONS  (STANDING):  atorvastatin 40 milliGRAM(s) Oral at bedtime  dextrose 40% Gel 15 Gram(s) Oral once  dextrose 5%. 1000 milliLiter(s) (50 mL/Hr) IV Continuous <Continuous>  dextrose 5%. 1000 milliLiter(s) (100 mL/Hr) IV Continuous <Continuous>  dextrose 50% Injectable 25 Gram(s) IV Push once  dextrose 50% Injectable 12.5 Gram(s) IV Push once  dextrose 50% Injectable 25 Gram(s) IV Push once  enoxaparin Injectable 40 milliGRAM(s) SubCutaneous daily  glucagon  Injectable 1 milliGRAM(s) IntraMuscular once  insulin glargine Injectable (LANTUS) 36 Unit(s) SubCutaneous at bedtime  insulin lispro Injectable (ADMELOG) 7 Unit(s) SubCutaneous every 4 hours  sodium chloride 0.9%. 1000 milliLiter(s) (125 mL/Hr) IV Continuous <Continuous>    MEDICATIONS  (PRN):  aluminum hydroxide/magnesium hydroxide/simethicone Suspension 30 milliLiter(s) Oral every 4 hours PRN Dyspepsia  LORazepam   Injectable 2 milliGRAM(s) IV Push every 2 hours PRN CIWA-Ar score increase by 2 points and a total score of 7 or less  LORazepam   Injectable 2 milliGRAM(s) IV Push every 1 hour PRN CIWA-Ar score 8 or greater  melatonin 3 milliGRAM(s) Oral at bedtime PRN Insomnia  ondansetron Injectable 4 milliGRAM(s) IV Push every 8 hours PRN Nausea and/or Vomiting      Objective:    Vitals: Vital Signs Last 24 Hrs  T(C): 36.9 (21 @ 10:00), Max: 37.1 (21 @ 22:15)  T(F): 98.4 (21 @ 10:00), Max: 98.8 (21 @ 22:15)  HR: 82 (21 @ 10:00) (81 - 88)  BP: 135/86 (21 @ 10:00) (131/90 - 139/98)  BP(mean): --  RR: 17 (21 @ 10:00) (16 - 18)  SpO2: 100% (21 @ 10:00) (100% - 100%)            I&O's Summary      PHYSICAL EXAM:  GENERAL: NAD  HEAD:  Atraumatic, Normocephalic  EYES: EOMI, conjunctiva and sclera clear  CHEST/LUNG: Clear to percussion bilaterally; No rales, rhonchi, wheezing, or rubs  HEART: Regular rate and rhythm; No murmurs, rubs, or gallops  ABDOMEN: Soft, Nontender, Nondistended;   SKIN: No rashes or lesions  NERVOUS SYSTEM:  Alert & Oriented X3, no focal deficits    LABS:      142  |  95<L>  |  8   ----------------------------<  235<H>  5.1   |  18<L>  |  0.76      133<L>  |  97<L>  |  10  ----------------------------<  240<H>  4.7   |  20<L>  |  0.77      132<L>  |  95<L>  |  10  ----------------------------<  238<H>  4.5   |  19<L>  |  0.80    Ca    8.6      30 Aug 2021 09:36  Ca    8.5      30 Aug 2021 03:12  Ca    8.6      29 Aug 2021 23:39  Phos  3.1     08-30  Mg     2.10     08-30    TPro  6.6  /  Alb  3.6  /  TBili  0.3  /  DBili  <0.2  /  AST  43<H>  /  ALT  39  /  AlkPhos  78  08-30  TPro  TNP  /  Alb  4.4  /  TBili  0.5  /  DBili  x   /  AST  80<H>  /  ALT  50<H>  /  AlkPhos  92  08-28                    Urinalysis Basic - ( 28 Aug 2021 16:36 )    Color: Light Yellow / Appearance: Clear / S.037 / pH: x  Gluc: x / Ketone: Small  / Bili: Negative / Urobili: <2 mg/dL   Blood: x / Protein: Trace / Nitrite: Negative   Leuk Esterase: Negative / RBC: 1 /HPF / WBC 5 /HPF   Sq Epi: x / Non Sq Epi: 2 /HPF / Bacteria: Negative                              14.3   5.35  )-----------( 212      ( 30 Aug 2021 08:21 )             42.7                         15.0   5.97  )-----------( 234      ( 29 Aug 2021 07:50 )             43.8                         16.5   6.77  )-----------( 266      ( 28 Aug 2021 16:36 )             45.4     CAPILLARY BLOOD GLUCOSE      POCT Blood Glucose.: 229 mg/dL (30 Aug 2021 10:01)  POCT Blood Glucose.: 213 mg/dL (30 Aug 2021 06:53)  POCT Blood Glucose.: 233 mg/dL (30 Aug 2021 03:26)  POCT Blood Glucose.: 232 mg/dL (29 Aug 2021 21:30)  POCT Blood Glucose.: 233 mg/dL (29 Aug 2021 18:17)  POCT Blood Glucose.: 272 mg/dL (29 Aug 2021 17:09)  POCT Blood Glucose.: 295 mg/dL (29 Aug 2021 12:13)      RADIOLOGY & ADDITIONAL TESTS:    Imaging Personally Reviewed:  [x ] YES  [ ] NO    Consultants involved in case:   Consultant(s) Notes Reviewed:  [ x] YES  [ ] NO:   Care Discussed with Consultants/Other Providers [x ] YES  [ ] NO         ***************************************************************  Rosamaria Moody, PGY1  Internal Medicine   pager: AGUSTINA: [phone], [facility]: [phone]  ***************************************************************    ALEXANDER GOODRICH  35y  MRN: [id number]    Patient is a 35y old  Male who presents with a chief complaint of hyperglycemia (29 Aug 2021 12:36)      Subjective: no events ON. Denies fever, CP, SOB, abn pain, N/V, dysuria. Tolerating diet.  Feels well, improved energy. Close to baseline. Still has some suprapubic pain. No dysuria.    MEDICATIONS  (STANDING):  atorvastatin 40 milliGRAM(s) Oral at bedtime  dextrose 40% Gel 15 Gram(s) Oral once  dextrose 5%. 1000 milliLiter(s) (50 mL/Hr) IV Continuous <Continuous>  dextrose 5%. 1000 milliLiter(s) (100 mL/Hr) IV Continuous <Continuous>  dextrose 50% Injectable 25 Gram(s) IV Push once  dextrose 50% Injectable 12.5 Gram(s) IV Push once  dextrose 50% Injectable 25 Gram(s) IV Push once  enoxaparin Injectable 40 milliGRAM(s) SubCutaneous daily  glucagon  Injectable 1 milliGRAM(s) IntraMuscular once  insulin glargine Injectable (LANTUS) 36 Unit(s) SubCutaneous at bedtime  insulin lispro Injectable (ADMELOG) 7 Unit(s) SubCutaneous every 4 hours  sodium chloride 0.9%. 1000 milliLiter(s) (125 mL/Hr) IV Continuous <Continuous>    MEDICATIONS  (PRN):  aluminum hydroxide/magnesium hydroxide/simethicone Suspension 30 milliLiter(s) Oral every 4 hours PRN Dyspepsia  LORazepam   Injectable 2 milliGRAM(s) IV Push every 2 hours PRN CIWA-Ar score increase by 2 points and a total score of 7 or less  LORazepam   Injectable 2 milliGRAM(s) IV Push every 1 hour PRN CIWA-Ar score 8 or greater  melatonin 3 milliGRAM(s) Oral at bedtime PRN Insomnia  ondansetron Injectable 4 milliGRAM(s) IV Push every 8 hours PRN Nausea and/or Vomiting      Objective:    Vitals: Vital Signs Last 24 Hrs  T(C): 36.9 (21 @ 10:00), Max: 37.1 (21 @ 22:15)  T(F): 98.4 (21 @ 10:00), Max: 98.8 (21 @ 22:15)  HR: 82 (21 @ 10:00) (81 - 88)  BP: 135/86 (21 @ 10:00) (131/90 - 139/98)  BP(mean): --  RR: 17 (21 @ 10:00) (16 - 18)  SpO2: 100% (21 @ 10:00) (100% - 100%)            I&O's Summary      PHYSICAL EXAM:  GENERAL: NAD, obese body habitus  HEAD:  Atraumatic, Normocephalic  EYES: EOMI, conjunctiva and sclera clear  CHEST/LUNG: Clear to percussion bilaterally; No rales, rhonchi, wheezing, or rubs  HEART: Regular rate and rhythm; No murmurs, rubs, or gallops  ABDOMEN: Soft, mild tenderness to palpation in suprapubic region  SKIN: No rashes or lesions  NERVOUS SYSTEM:  Alert & Oriented X3, no focal deficits. Mildly decreased propioception in feet  Ext: no foot ulcers, no rashs    LABS:      142  |  95<L>  |  8   ----------------------------<  235<H>  5.1   |  18<L>  |  0.76      133<L>  |  97<L>  |  10  ----------------------------<  240<H>  4.7   |  20<L>  |  0.77      132<L>  |  95<L>  |  10  ----------------------------<  238<H>  4.5   |  19<L>  |  0.80    Ca    8.6      30 Aug 2021 09:36  Ca    8.5      30 Aug 2021 03:12  Ca    8.6      29 Aug 2021 23:39  Phos  3.1     08-30  Mg     2.10     -30    TPro  6.6  /  Alb  3.6  /  TBili  0.3  /  DBili  <0.2  /  AST  43<H>  /  ALT  39  /  AlkPhos  78  30  TPro  TNP  /  Alb  4.4  /  TBili  0.5  /  DBili  x   /  AST  80<H>  /  ALT  50<H>  /  AlkPhos  92  0828                    Urinalysis Basic - ( 28 Aug 2021 16:36 )    Color: Light Yellow / Appearance: Clear / S.037 / pH: x  Gluc: x / Ketone: Small  / Bili: Negative / Urobili: <2 mg/dL   Blood: x / Protein: Trace / Nitrite: Negative   Leuk Esterase: Negative / RBC: 1 /HPF / WBC 5 /HPF   Sq Epi: x / Non Sq Epi: 2 /HPF / Bacteria: Negative                              14.3   5.35  )-----------( 212      ( 30 Aug 2021 08:21 )             42.7                         15.0   5.97  )-----------( 234      ( 29 Aug 2021 07:50 )             43.8                         16.5   6.77  )-----------( 266      ( 28 Aug 2021 16:36 )             45.4     CAPILLARY BLOOD GLUCOSE      POCT Blood Glucose.: 229 mg/dL (30 Aug 2021 10:01)  POCT Blood Glucose.: 213 mg/dL (30 Aug 2021 06:53)  POCT Blood Glucose.: 233 mg/dL (30 Aug 2021 03:26)  POCT Blood Glucose.: 232 mg/dL (29 Aug 2021 21:30)  POCT Blood Glucose.: 233 mg/dL (29 Aug 2021 18:17)  POCT Blood Glucose.: 272 mg/dL (29 Aug 2021 17:09)  POCT Blood Glucose.: 295 mg/dL (29 Aug 2021 12:13)      RADIOLOGY & ADDITIONAL TESTS:    Imaging Personally Reviewed:  [x ] YES  [ ] NO    Consultants involved in case:   Consultant(s) Notes Reviewed:  [ x] YES  [ ] NO:   Care Discussed with Consultants/Other Providers [x ] YES  [ ] NO

## 2021-08-30 NOTE — DISCHARGE NOTE PROVIDER - INSTRUCTIONS
Make sure to follow a healthy diet. A good diet to follow is the mediterranean diet to help with your sugar control and prevent heart disease.

## 2021-08-30 NOTE — PROGRESS NOTE ADULT - PROBLEM SELECTOR PLAN 4
- Encouraged weight loss & lifestyle changes including dietary modifications & exercise.   - consider outpatient obesity medicine referral  - send lipid panel - Encouraged weight loss & lifestyle changes including dietary modifications & exercise.   - consider outpatient obesity medicine referral

## 2021-08-30 NOTE — PROGRESS NOTE ADULT - PROBLEM SELECTOR PLAN 2
-poor outpatient follow up, A1c was 10.8  -will need diabetes teaching, PCP follow-up, foot and eye exams  -further recs for transition to outpatient regimen per endo  -nutrition consult -poor outpatient follow up, A1c was 10.8  -will need diabetes teaching, PCP follow-up, foot and eye exams  -further recs for transition to outpatient regimen per endo  -nutrition consulted

## 2021-08-30 NOTE — PROGRESS NOTE ADULT - PROBLEM SELECTOR PLAN 5
Diet: NPO except water with meds until FS<250  DVT ppx: lovenox 40 qd - Lipid panel with cholesterol 351, LDL not calculated  -start high intensity statin: atorvastatin 40mg - Lipid panel with cholesterol 351, LDL not calculated  -repeat LDL in AM  -start high intensity statin: atorvastatin 40mg

## 2021-08-30 NOTE — PROGRESS NOTE ADULT - SUBJECTIVE AND OBJECTIVE BOX
Endocrine New Consult  HPI:  34 yo M with PMH of Pre-DM, obesity, ETOH abuse presenting with hyperglycemia, FS 500s w/ 2 weeks of polyuria, polydipsia, fatigue, suprapubic pressure. Has not followed regularly with any PCP. Went to urgent care after feeling significant weakness, found to have sugars in 500s. Endocrine consulted for hyperglycemia in the setting of newly diagnosed uncontrolled DM2 w/ mild DKA.     ENDOCRINE HISTORY:   patient continues to be in DKA with worsening acidosis.  Is NPO.  Denies n/v.      PAST MEDICAL & SURGICAL HISTORY:  PreDM    No significant past surgical history    FAMILY HISTORY:  FH: type 2 diabetes (Mother)    Social History:  Lives with wife. Does not use tobacco/marijuana/vape. Drinks 6 beers a day, every day. (29 Aug 2021 11:51)    Home Medications:    MEDICATIONS  (STANDING):  dextrose 40% Gel 15 Gram(s) Oral once  dextrose 5%. 1000 milliLiter(s) (50 mL/Hr) IV Continuous <Continuous>  dextrose 5%. 1000 milliLiter(s) (100 mL/Hr) IV Continuous <Continuous>  dextrose 50% Injectable 25 Gram(s) IV Push once  dextrose 50% Injectable 12.5 Gram(s) IV Push once  dextrose 50% Injectable 25 Gram(s) IV Push once  glucagon  Injectable 1 milliGRAM(s) IntraMuscular once  insulin lispro (ADMELOG) corrective regimen sliding scale   SubCutaneous three times a day before meals  insulin lispro (ADMELOG) corrective regimen sliding scale   SubCutaneous at bedtime  insulin lispro Injectable (ADMELOG) 10 Unit(s) SubCutaneous three times a day before meals  sodium chloride 0.9%. 1000 milliLiter(s) (125 mL/Hr) IV Continuous <Continuous>    MEDICATIONS  (PRN):    Allergies  No Known Allergies  Intolerances    Review of Systems:  Constitutional: No fevers. chills, +fatigue   Eyes: +blurry vision  Neuro: No tremors, +numbness/tingling   ALL OTHER SYSTEMS REVIEWED AND NEGATIVE    Vital Signs Last 24 Hrs  T(C): 36.9 (30 Aug 2021 14:00), Max: 37.1 (29 Aug 2021 22:15)  T(F): 98.5 (30 Aug 2021 14:00), Max: 98.8 (29 Aug 2021 22:15)  HR: 86 (30 Aug 2021 14:00) (81 - 88)  BP: 120/80 (30 Aug 2021 14:00) (120/80 - 139/98)  BP(mean): --  RR: 17 (30 Aug 2021 14:00) (16 - 18)  SpO2: 100% (30 Aug 2021 14:00) (100% - 100%)    GENERAL: NAD, well-groomed, well-developed, obese male   EYES: No proptosis, no lid lag, anicteric  GI: Soft, nontender, non distended, normal bowel sounds  SKIN: Dry, intact, No rashes or lesions  MUSCULOSKELETAL: Full range of motion, normal strength  NEURO: sensation intact, extraocular movements intact, no tremor  PSYCH: Alert and oriented x 3, reactive affect, euthymic mood     CAPILLARY BLOOD GLUCOSE  POCT Blood Glucose.: 202 mg/dL (30 Aug 2021 14:02)  POCT Blood Glucose.: 229 mg/dL (30 Aug 2021 10:01)  POCT Blood Glucose.: 213 mg/dL (30 Aug 2021 06:53)  POCT Blood Glucose.: 233 mg/dL (30 Aug 2021 03:26)  POCT Blood Glucose.: 232 mg/dL (29 Aug 2021 21:30)  POCT Blood Glucose.: 233 mg/dL (29 Aug 2021 18:17)  POCT Blood Glucose.: 272 mg/dL (29 Aug 2021 17:09)  POCT Blood Glucose.: 295 mg/dL (08-29-21 @ 12:13)  POCT Blood Glucose.: 336 mg/dL (08-29-21 @ 07:20)  POCT Blood Glucose.: 315 mg/dL (08-29-21 @ 00:56)  POCT Blood Glucose.: 335 mg/dL (08-28-21 @ 23:34)  POCT Blood Glucose.: 337 mg/dL (08-28-21 @ 22:11)  POCT Blood Glucose.: 358 mg/dL (08-28-21 @ 20:18)  POCT Blood Glucose.: 362 mg/dL (08-28-21 @ 19:51)  POCT Blood Glucose.: 452 mg/dL (08-28-21 @ 17:50)  POCT Blood Glucose.: 571 mg/dL (08-28-21 @ 14:51)                        15.0   5.97  )-----------( 234      ( 29 Aug 2021 07:50 )             43.8     08-30    142  |  95<L>  |  8   ----------------------------<  235<H>  5.1   |  18<L>  |  0.76    Ca    8.6      30 Aug 2021 09:36  Phos  3.1     08-30  Mg     2.10     08-30    TPro  6.6  /  Alb  3.6  /  TBili  0.3  /  DBili  <0.2  /  AST  43<H>  /  ALT  39  /  AlkPhos  78  08-30

## 2021-08-30 NOTE — PROGRESS NOTE ADULT - PROBLEM SELECTOR PLAN 3
6 beers per day, last drink 8/26. Has had mild withdrawal sxs in past but no seizure  -s/p librium 50mg 8/28 in ED  -pt is highly motivated to quit drinking  -No withdrawal sxs currently but pt is at risk for withdrawal while inpatient  -symptom-triggered CIWAs to monitor  -Mild elevation in transaminases likely iso alcohol use AST 80, ALT 50. CTM LFTs 6 beers per day, last drink 8/26. Has had mild withdrawal sxs in past but no seizure  -s/p librium 50mg 8/28 in ED  -pt is highly motivated to quit drinking  -No withdrawal sxs currently but pt is at risk for withdrawal while inpatient  -CIWAs have been 0.   -Mild elevation in transaminases likely iso alcohol use AST 80, ALT 50. Now downtrending

## 2021-08-30 NOTE — DISCHARGE NOTE PROVIDER - NSDCFUADDAPPT_GEN_ALL_CORE_FT
Please follow up with your primary care doctor within 2 weeks.    Please follow up with endocrinology on 10/12/21   [address], [place] [id number] [phone]    You will also need to make an appointment with the foot doctor (podiatrist) and the eye doctor (ophthalmologist)

## 2021-08-30 NOTE — DISCHARGE NOTE PROVIDER - NSDCFUSCHEDAPPT_GEN_ALL_CORE_FT
ALEXANDER GOODRICH S ; 10/12/2021 ; [facility] [address] [facility]  ALEXANDER GOODRICH S ; 11/30/2021 ; [facility] [address] [facility]

## 2021-08-30 NOTE — PROGRESS NOTE ADULT - PROBLEM SELECTOR PLAN 1
Mild DKA given VBG pH 7.34, BHB 2.6, AG 27, BGs 500s. Treated with subq insulin.   -Now s/p 4L IVF boluses, 31u lantus and 10u premeals and sugars in 300s  -Endo consulted, appreciate recs:    	- NPO until DKA resolved, AG < 14, Bicarb > 18  	- IVF Hydration   	- If Blood glucose less then 250mg/dL, start Dextrose containing IVF and continue insulin treatment per 		DKA protocol   	- Lantus 36 units SC QHS   	- Start Admelog 15 units [id number] until BG less then 250mg/dL  	- Once BG level less than 250mg/dL, Decrease Admelog to 7 units SC [id number]   	- Once DKA resolved, AG < 14 and Bicarb > 18:  Decrease Admelog to 7 units SC TIDAC/Premeal. Restart 	Carb Consistent Diet   	No Correction Scale >> change to moderate scale tidac/premeal once dka resolved & po diet resumed   	FS BG Monitoring [id number] until resolution of DKA >> change to tidac/premeal & bedtime once dka 		resolved & po diet resumed Mild DKA given VBG pH 7.34, BHB 2.6, AG 27, BGs 500s. Treated with subq insulin.   -Now s/p 4L IVF boluses, 31u lantus and 10u premeals and sugars in 300s  -Endo consulted, appreciate recs:  -NPO until DKA resolved.  -currently lantus 36u (per endo to uptitrate), q4 7u admelog, Medium dose sliding scale  -will start D5 when FS<200  -gap reopening Mild DKA given VBG pH 7.34, BHB 2.6, AG 27, BGs 500s. Treated with subq insulin.   -Now s/p 4L IVF boluses, 31u lantus and 10u premeals and sugars in 300s  -Endo consulted, appreciate recs:  -NPO until DKA resolved.  -currently lantus 46u, NPH 7u. Medium dose sliding scale  -will start D5 when FS<200  -gap reopening

## 2021-08-30 NOTE — DISCHARGE NOTE PROVIDER - NSFOLLOWUPCLINICS_GEN_ALL_ED_FT
[facility]  Endocrinology  [address] [place] [address]  Phone: [phone]  Fax:     [facility]  Ophthalmology  [address] [facility], [address]  [place] [address]  Phone: [phone]  Fax:     [facility]  Podiatry  [address] [place] [address] [address]rd [place] [address]  Phone: [phone]  Fax:

## 2021-08-31 LAB
ANION GAP SERPL CALC-SCNC: 13 MMOL/L — SIGNIFICANT CHANGE UP (ref 7–14)
BASE EXCESS BLDV CALC-SCNC: -0.4 MMOL/L — SIGNIFICANT CHANGE UP (ref -2–3)
BASE EXCESS BLDV CALC-SCNC: 0.8 MMOL/L — SIGNIFICANT CHANGE UP (ref -2–3)
BLOOD GAS VENOUS COMPREHENSIVE RESULT: SIGNIFICANT CHANGE UP
BLOOD GAS VENOUS COMPREHENSIVE RESULT: SIGNIFICANT CHANGE UP
BUN SERPL-MCNC: 7 MG/DL — SIGNIFICANT CHANGE UP (ref 7–23)
BUN SERPL-MCNC: 7 MG/DL — SIGNIFICANT CHANGE UP (ref 7–23)
BUN SERPL-MCNC: 9 MG/DL — SIGNIFICANT CHANGE UP (ref 7–23)
CALCIUM SERPL-MCNC: 8.5 MG/DL — SIGNIFICANT CHANGE UP (ref 8.4–10.5)
CALCIUM SERPL-MCNC: 8.6 MG/DL — SIGNIFICANT CHANGE UP (ref 8.4–10.5)
CALCIUM SERPL-MCNC: 8.6 MG/DL — SIGNIFICANT CHANGE UP (ref 8.4–10.5)
CHLORIDE BLDV-SCNC: 103 MMOL/L — SIGNIFICANT CHANGE UP (ref 96–108)
CHLORIDE BLDV-SCNC: 105 MMOL/L — SIGNIFICANT CHANGE UP (ref 96–108)
CHLORIDE SERPL-SCNC: 101 MMOL/L — SIGNIFICANT CHANGE UP (ref 98–107)
CHLORIDE SERPL-SCNC: 99 MMOL/L — SIGNIFICANT CHANGE UP (ref 98–107)
CHLORIDE SERPL-SCNC: 99 MMOL/L — SIGNIFICANT CHANGE UP (ref 98–107)
CO2 BLDV-SCNC: 27.9 MMOL/L — HIGH (ref 22–26)
CO2 BLDV-SCNC: 29.5 MMOL/L — HIGH (ref 22–26)
CO2 SERPL-SCNC: 21 MMOL/L — LOW (ref 22–31)
CO2 SERPL-SCNC: 22 MMOL/L — SIGNIFICANT CHANGE UP (ref 22–31)
CO2 SERPL-SCNC: 22 MMOL/L — SIGNIFICANT CHANGE UP (ref 22–31)
CREAT SERPL-MCNC: 0.69 MG/DL — SIGNIFICANT CHANGE UP (ref 0.5–1.3)
CREAT SERPL-MCNC: 0.75 MG/DL — SIGNIFICANT CHANGE UP (ref 0.5–1.3)
CREAT SERPL-MCNC: 0.78 MG/DL — SIGNIFICANT CHANGE UP (ref 0.5–1.3)
GAS PNL BLDV: 133 MMOL/L — LOW (ref 136–145)
GAS PNL BLDV: 137 MMOL/L — SIGNIFICANT CHANGE UP (ref 136–145)
GLUCOSE BLDC GLUCOMTR-MCNC: 153 MG/DL — HIGH (ref 70–99)
GLUCOSE BLDC GLUCOMTR-MCNC: 158 MG/DL — HIGH (ref 70–99)
GLUCOSE BLDC GLUCOMTR-MCNC: 184 MG/DL — HIGH (ref 70–99)
GLUCOSE BLDC GLUCOMTR-MCNC: 201 MG/DL — HIGH (ref 70–99)
GLUCOSE BLDC GLUCOMTR-MCNC: 208 MG/DL — HIGH (ref 70–99)
GLUCOSE BLDC GLUCOMTR-MCNC: 216 MG/DL — HIGH (ref 70–99)
GLUCOSE BLDV-MCNC: 184 MG/DL — HIGH (ref 70–99)
GLUCOSE BLDV-MCNC: 237 MG/DL — HIGH (ref 70–99)
GLUCOSE SERPL-MCNC: 172 MG/DL — HIGH (ref 70–99)
GLUCOSE SERPL-MCNC: 217 MG/DL — HIGH (ref 70–99)
GLUCOSE SERPL-MCNC: 236 MG/DL — HIGH (ref 70–99)
HCO3 BLDV-SCNC: 26 MMOL/L — SIGNIFICANT CHANGE UP (ref 22–29)
HCO3 BLDV-SCNC: 28 MMOL/L — SIGNIFICANT CHANGE UP (ref 22–29)
HCT VFR BLD CALC: 42.6 % — SIGNIFICANT CHANGE UP (ref 39–50)
HCT VFR BLDA CALC: 45 % — SIGNIFICANT CHANGE UP (ref 39–51)
HCT VFR BLDA CALC: 46 % — SIGNIFICANT CHANGE UP (ref 39–51)
HGB BLD CALC-MCNC: 14.9 G/DL — SIGNIFICANT CHANGE UP (ref 13–17)
HGB BLD CALC-MCNC: 15.4 G/DL — SIGNIFICANT CHANGE UP (ref 13–17)
HGB BLD-MCNC: 14.6 G/DL — SIGNIFICANT CHANGE UP (ref 13–17)
LACTATE BLDV-MCNC: 1.4 MMOL/L — SIGNIFICANT CHANGE UP (ref 0.5–2)
LACTATE BLDV-MCNC: 1.6 MMOL/L — SIGNIFICANT CHANGE UP (ref 0.5–2)
MAGNESIUM SERPL-MCNC: 1.7 MG/DL — SIGNIFICANT CHANGE UP (ref 1.6–2.6)
MAGNESIUM SERPL-MCNC: 1.8 MG/DL — SIGNIFICANT CHANGE UP (ref 1.6–2.6)
MAGNESIUM SERPL-MCNC: 1.9 MG/DL — SIGNIFICANT CHANGE UP (ref 1.6–2.6)
MCHC RBC-ENTMCNC: 29.1 PG — SIGNIFICANT CHANGE UP (ref 27–34)
MCHC RBC-ENTMCNC: 34.3 GM/DL — SIGNIFICANT CHANGE UP (ref 32–36)
MCV RBC AUTO: 85 FL — SIGNIFICANT CHANGE UP (ref 80–100)
NRBC # BLD: 0 /100 WBCS — SIGNIFICANT CHANGE UP
NRBC # FLD: 0 K/UL — SIGNIFICANT CHANGE UP
PCO2 BLDV: 50 MMHG — SIGNIFICANT CHANGE UP (ref 42–55)
PCO2 BLDV: 53 MMHG — SIGNIFICANT CHANGE UP (ref 42–55)
PH BLDV: 7.33 — SIGNIFICANT CHANGE UP (ref 7.32–7.43)
PH BLDV: 7.33 — SIGNIFICANT CHANGE UP (ref 7.32–7.43)
PHOSPHATE SERPL-MCNC: 2.4 MG/DL — LOW (ref 2.5–4.5)
PHOSPHATE SERPL-MCNC: 2.6 MG/DL — SIGNIFICANT CHANGE UP (ref 2.5–4.5)
PHOSPHATE SERPL-MCNC: 3.3 MG/DL — SIGNIFICANT CHANGE UP (ref 2.5–4.5)
PLATELET # BLD AUTO: 222 K/UL — SIGNIFICANT CHANGE UP (ref 150–400)
PO2 BLDV: 37 MMHG — SIGNIFICANT CHANGE UP
PO2 BLDV: 49 MMHG — SIGNIFICANT CHANGE UP
POTASSIUM BLDV-SCNC: 3.7 MMOL/L — SIGNIFICANT CHANGE UP (ref 3.5–5.1)
POTASSIUM BLDV-SCNC: 4.1 MMOL/L — SIGNIFICANT CHANGE UP (ref 3.5–5.1)
POTASSIUM SERPL-MCNC: 3.8 MMOL/L — SIGNIFICANT CHANGE UP (ref 3.5–5.3)
POTASSIUM SERPL-MCNC: 3.9 MMOL/L — SIGNIFICANT CHANGE UP (ref 3.5–5.3)
POTASSIUM SERPL-MCNC: 4.2 MMOL/L — SIGNIFICANT CHANGE UP (ref 3.5–5.3)
POTASSIUM SERPL-SCNC: 3.8 MMOL/L — SIGNIFICANT CHANGE UP (ref 3.5–5.3)
POTASSIUM SERPL-SCNC: 3.9 MMOL/L — SIGNIFICANT CHANGE UP (ref 3.5–5.3)
POTASSIUM SERPL-SCNC: 4.2 MMOL/L — SIGNIFICANT CHANGE UP (ref 3.5–5.3)
RBC # BLD: 5.01 M/UL — SIGNIFICANT CHANGE UP (ref 4.2–5.8)
RBC # FLD: 13.1 % — SIGNIFICANT CHANGE UP (ref 10.3–14.5)
SAO2 % BLDV: 60.5 % — SIGNIFICANT CHANGE UP
SAO2 % BLDV: 79.6 % — SIGNIFICANT CHANGE UP
SODIUM SERPL-SCNC: 134 MMOL/L — LOW (ref 135–145)
SODIUM SERPL-SCNC: 134 MMOL/L — LOW (ref 135–145)
SODIUM SERPL-SCNC: 135 MMOL/L — SIGNIFICANT CHANGE UP (ref 135–145)
WBC # BLD: 5.29 K/UL — SIGNIFICANT CHANGE UP (ref 3.8–10.5)
WBC # FLD AUTO: 5.29 K/UL — SIGNIFICANT CHANGE UP (ref 3.8–10.5)

## 2021-08-31 PROCEDURE — 99233 SBSQ HOSP IP/OBS HIGH 50: CPT | Mod: GC

## 2021-08-31 PROCEDURE — 99232 SBSQ HOSP IP/OBS MODERATE 35: CPT

## 2021-08-31 RX ORDER — SODIUM CHLORIDE 9 MG/ML
1000 INJECTION, SOLUTION INTRAVENOUS
Refills: 0 | Status: DISCONTINUED | OUTPATIENT
Start: 2021-08-31 | End: 2021-08-31

## 2021-08-31 RX ORDER — INSULIN LISPRO 100/ML
VIAL (ML) SUBCUTANEOUS
Refills: 0 | Status: DISCONTINUED | OUTPATIENT
Start: 2021-08-31 | End: 2021-09-01

## 2021-08-31 RX ORDER — INSULIN LISPRO 100/ML
VIAL (ML) SUBCUTANEOUS AT BEDTIME
Refills: 0 | Status: DISCONTINUED | OUTPATIENT
Start: 2021-08-31 | End: 2021-09-01

## 2021-08-31 RX ORDER — INSULIN LISPRO 100/ML
5 VIAL (ML) SUBCUTANEOUS
Refills: 0 | Status: DISCONTINUED | OUTPATIENT
Start: 2021-08-31 | End: 2021-09-01

## 2021-08-31 RX ORDER — POTASSIUM CHLORIDE 20 MEQ
40 PACKET (EA) ORAL ONCE
Refills: 0 | Status: COMPLETED | OUTPATIENT
Start: 2021-08-31 | End: 2021-08-31

## 2021-08-31 RX ORDER — ATORVASTATIN CALCIUM 80 MG/1
20 TABLET, FILM COATED ORAL AT BEDTIME
Refills: 0 | Status: DISCONTINUED | OUTPATIENT
Start: 2021-08-31 | End: 2021-09-01

## 2021-08-31 RX ORDER — FENOFIBRATE,MICRONIZED 130 MG
145 CAPSULE ORAL DAILY
Refills: 0 | Status: DISCONTINUED | OUTPATIENT
Start: 2021-08-31 | End: 2021-09-01

## 2021-08-31 RX ORDER — OMEGA-3 ACID ETHYL ESTERS 1 G
2 CAPSULE ORAL
Refills: 0 | Status: DISCONTINUED | OUTPATIENT
Start: 2021-08-31 | End: 2021-09-01

## 2021-08-31 RX ADMIN — Medication 145 MILLIGRAM(S): at 13:34

## 2021-08-31 RX ADMIN — Medication 40 MILLIEQUIVALENT(S): at 22:43

## 2021-08-31 RX ADMIN — Medication 4: at 13:23

## 2021-08-31 RX ADMIN — Medication 2: at 02:58

## 2021-08-31 RX ADMIN — Medication 40 MILLIEQUIVALENT(S): at 10:26

## 2021-08-31 RX ADMIN — Medication 5 UNIT(S): at 13:23

## 2021-08-31 RX ADMIN — INSULIN GLARGINE 46 UNIT(S): 100 INJECTION, SOLUTION SUBCUTANEOUS at 22:43

## 2021-08-31 RX ADMIN — Medication 2 GRAM(S): at 17:42

## 2021-08-31 RX ADMIN — ENOXAPARIN SODIUM 40 MILLIGRAM(S): 100 INJECTION SUBCUTANEOUS at 13:34

## 2021-08-31 RX ADMIN — Medication 40 MILLIEQUIVALENT(S): at 03:28

## 2021-08-31 RX ADMIN — Medication 4: at 10:13

## 2021-08-31 RX ADMIN — SODIUM CHLORIDE 100 MILLILITER(S): 9 INJECTION, SOLUTION INTRAVENOUS at 07:17

## 2021-08-31 RX ADMIN — Medication 5 UNIT(S): at 18:19

## 2021-08-31 RX ADMIN — ATORVASTATIN CALCIUM 20 MILLIGRAM(S): 80 TABLET, FILM COATED ORAL at 22:49

## 2021-08-31 RX ADMIN — Medication 2: at 06:18

## 2021-08-31 RX ADMIN — SODIUM CHLORIDE 100 MILLILITER(S): 9 INJECTION, SOLUTION INTRAVENOUS at 10:14

## 2021-08-31 RX ADMIN — Medication 2: at 18:19

## 2021-08-31 NOTE — DIETITIAN INITIAL EVALUATION ADULT. - PROBLEM SELECTOR PLAN 1
Mild DKA given VBG pH 7.34, BHB 2.6, AG 27, BGs 500s. Treated with subq insulin.   -Now s/p 4L IVF boluses, 31u lantus and 10u premeals and sugars in 300s  -Endo consulted, appreciate recs:    	- NPO until DKA resolved, AG < 14, Bicarb > 18  	- IVF Hydration   	- If Blood glucose less then 250mg/dL, start Dextrose containing IVF and continue insulin treatment per 		DKA protocol   	- Lantus 36 units SC QHS   	- Start Admelog 15 units [id number] until BG less then 250mg/dL  	- Once BG level less than 250mg/dL, Decrease Admelog to 7 units SC [id number]   	- Once DKA resolved, AG < 14 and Bicarb > 18:  Decrease Admelog to 7 units SC TIDAC/Premeal. Restart 	Carb Consistent Diet   	No Correction Scale >> change to moderate scale tidac/premeal once dka resolved & po diet resumed   	FS BG Monitoring [id number] until resolution of DKA >> change to tidac/premeal & bedtime once dka 		resolved & po diet resumed

## 2021-08-31 NOTE — DIETITIAN INITIAL EVALUATION ADULT. - ORAL INTAKE PTA/DIET HISTORY
Pt reports good po, skips breakfast meal often. Drinks large amounts of soda and reports increased intakes of fresh fruits. Also reports to drinking beer in excess.

## 2021-08-31 NOTE — DIETITIAN INITIAL EVALUATION ADULT. - OTHER INFO
Per chart----34 yo M with PMH of Pre-DM, obesity, ETOH abuse presenting with hyperglycemia, FS 500s w/ 2 weeks of polyuria, polydipsia, fatigue, suprapubic pressure. Has not followed regularly with any PCP. Went to urgent care after feeling significant weakness, found to have sugars in 500s.      Pt remains NPO, requesting to eat.  Denies chewing, swallowing difficulties or any nausea, vomiting, diarrhea, constipation. Pt with newly diagnosed Diabetes. Pt unable to clarify weight loss Prior to admission, states weight was 325#. Noted admit weight 312#.  Pt provided with verbal and written instructions on low na and consistent CHO diet principles. Pt was encouraged portion controlled meals, in an effort to promote glycemic control and long term weight reduction. Pt's Spouse prepares most of the meals and Pt enjoys [facility] [employer]. Pt was able to provide list of staple foods that are used in meal preparation. Reviewed foods that contain CHO and encouraged these foods be consumed in moderation. Pt drinks alcohol, excess juices and sodas. Suggested unsweetened beverage intakes. Pt remains with elevated TG and very low HDL. Reviewed heart healthy meal preparation and physical activity per MD recs. Pt will benefit from outpatient Endocrine follow up and [facility]

## 2021-08-31 NOTE — PROGRESS NOTE ADULT - SUBJECTIVE AND OBJECTIVE BOX
***************************************************************  Rosamaria Moody, PGY1  Internal Medicine   pager: AGUSTINA: [phone], [facility]: [phone]  ***************************************************************    ALEXANDER GOODRICH  35y  MRN: [id number]    Patient is a 35y old  Male who presents with a chief complaint of hyperglycemia (31 Aug 2021 10:32)      Subjective: no events ON. Denies fever, CP, SOB, abn pain, N/V, dysuria. Feels hungry since he has been NPO for 2 days. Feels back to his normal self. Able to walk around.    MEDICATIONS  (STANDING):  atorvastatin 20 milliGRAM(s) Oral at bedtime  dextrose 40% Gel 15 Gram(s) Oral once  dextrose 5% + sodium chloride 0.45%. 1000 milliLiter(s) (100 mL/Hr) IV Continuous <Continuous>  dextrose 5%. 1000 milliLiter(s) (50 mL/Hr) IV Continuous <Continuous>  dextrose 5%. 1000 milliLiter(s) (100 mL/Hr) IV Continuous <Continuous>  dextrose 50% Injectable 25 Gram(s) IV Push once  dextrose 50% Injectable 12.5 Gram(s) IV Push once  dextrose 50% Injectable 25 Gram(s) IV Push once  enoxaparin Injectable 40 milliGRAM(s) SubCutaneous daily  fenofibrate Tablet 145 milliGRAM(s) Oral daily  glucagon  Injectable 1 milliGRAM(s) IntraMuscular once  insulin glargine Injectable (LANTUS) 46 Unit(s) SubCutaneous at bedtime  insulin lispro (ADMELOG) corrective regimen sliding scale   SubCutaneous three times a day before meals  insulin lispro (ADMELOG) corrective regimen sliding scale   SubCutaneous at bedtime  insulin lispro Injectable (ADMELOG) 5 Unit(s) SubCutaneous three times a day before meals  omega-3-Acid Ethyl Esters 2 Gram(s) Oral two times a day    MEDICATIONS  (PRN):  aluminum hydroxide/magnesium hydroxide/simethicone Suspension 30 milliLiter(s) Oral every 4 hours PRN Dyspepsia  LORazepam   Injectable 2 milliGRAM(s) IV Push every 2 hours PRN CIWA-Ar score increase by 2 points and a total score of 7 or less  LORazepam   Injectable 2 milliGRAM(s) IV Push every 1 hour PRN CIWA-Ar score 8 or greater  melatonin 3 milliGRAM(s) Oral at bedtime PRN Insomnia  ondansetron Injectable 4 milliGRAM(s) IV Push every 8 hours PRN Nausea and/or Vomiting      Objective:    Vitals: Vital Signs Last 24 Hrs  T(C): 36.9 (08-31-21 @ 10:00), Max: 37.1 (08-30-21 @ 13:29)  T(F): 98.4 (08-31-21 @ 10:00), Max: 98.7 (08-30-21 @ 13:29)  HR: 79 (08-31-21 @ 10:00) (76 - 86)  BP: 131/81 (08-31-21 @ 10:00) (120/80 - 138/81)  BP(mean): --  RR: 18 (08-31-21 @ 10:00) (17 - 18)  SpO2: 100% (08-31-21 @ 10:00) (99% - 100%)            I&O's Summary      PHYSICAL EXAM:  GENERAL: NAD, obese body habitus  HEAD:  Atraumatic, Normocephalic  EYES: EOMI, conjunctiva and sclera clear  CHEST/LUNG: Clear to percussion bilaterally; No rales, rhonchi, wheezing, or rubs  HEART: Regular rate and rhythm; No murmurs, rubs, or gallops  ABDOMEN: Soft, Nontender, Nondistended;   SKIN: No rashes or lesions  NERVOUS SYSTEM:  Alert & Oriented X3, no focal deficits    LABS:  08-31    134<L>  |  99  |  7   ----------------------------<  217<H>  4.2   |  22  |  0.75  08-31    134<L>  |  99  |  7   ----------------------------<  172<H>  3.8   |  22  |  0.69  08-30    135  |  100  |  8   ----------------------------<  169<H>  3.8   |  20<L>  |  0.72    Ca    8.6      31 Aug 2021 11:41  Ca    8.5      31 Aug 2021 07:23  Ca    8.5      30 Aug 2021 23:28  Phos  2.6     08-31  Mg     1.80     08-31    TPro  6.6  /  Alb  3.6  /  TBili  0.3  /  DBili  <0.2  /  AST  43<H>  /  ALT  39  /  AlkPhos  78  08-30  TPro  TNP  /  Alb  4.4  /  TBili  0.5  /  DBili  x   /  AST  80<H>  /  ALT  50<H>  /  AlkPhos  92  08-28                                              14.6   5.29  )-----------( 222      ( 31 Aug 2021 07:57 )             42.6                         14.3   5.35  )-----------( 212      ( 30 Aug 2021 08:21 )             42.7                         15.0   5.97  )-----------( 234      ( 29 Aug 2021 07:50 )             43.8     CAPILLARY BLOOD GLUCOSE      POCT Blood Glucose.: 208 mg/dL (31 Aug 2021 10:09)  POCT Blood Glucose.: 158 mg/dL (31 Aug 2021 06:16)  POCT Blood Glucose.: 153 mg/dL (31 Aug 2021 02:54)  POCT Blood Glucose.: 161 mg/dL (30 Aug 2021 22:01)  POCT Blood Glucose.: 174 mg/dL (30 Aug 2021 18:01)  POCT Blood Glucose.: 202 mg/dL (30 Aug 2021 14:02)      RADIOLOGY & ADDITIONAL TESTS:    Imaging Personally Reviewed:  [x ] YES  [ ] NO    Consultants involved in case:   Consultant(s) Notes Reviewed:  [ x] YES  [ ] NO:   Care Discussed with Consultants/Other Providers [x ] YES  [ ] NO

## 2021-08-31 NOTE — PROGRESS NOTE ADULT - PROBLEM SELECTOR PLAN 1
Mild DKA given VBG pH 7.34, BHB 2.6, AG 27, BGs 500s. Treated with subq insulin.   -Now s/p 4L IVF boluses, 31u lantus and 10u premeals and sugars in 300s  -Endo consulted, appreciate recs:  -DKA now resolved, Gap 13.  -transition to PO CC diet, lantus 46u qHs, 5u premeals, medium dose sliding scale

## 2021-08-31 NOTE — PROGRESS NOTE ADULT - ASSESSMENT
36 yo M with PMH of Pre-DM, obesity, ETOH abuse presenting with hyperglycemia, FS 500s w/ 2 weeks of polyuria, polydipsia, fatigue, suprapubic pressure. Has not followed regularly with any PCP. Went to urgent care after feeling significant weakness, found to have sugars in 500s. Endocrine consulted for hyperglycemia in the setting of newly diagnosed uncontrolled DM2 w/ mild DKA.    Mild DKA  New DM2, Uncontrolled   A1c 10.8%   + polyuria, polydypsia, numbness/tingling in feet, blurry vision, fatigue   Recommendations:   - AG improved  - DKA resolved  - D/c D5 IVF  - Target -180 mg/dl  - Continue Lantus 46 units SC QHS  - Start Admelog 5 units TID AC  - Agree with change to admelog moderate scale ac/hs  - Hypoglycemia protocol   - Nutrition consult appreciated  - For Bedside RN- Diabetes/Insulin PEN Teaching     DC Planning: likely on basal/bolus regimen vs basal/oral depending on patient acceptance. Doses/DC regimen to be determined. Patient will need nutrition consult, diabetes/insulin PEN teaching prior to discharge. Please send Test RX for basal insulin pen (ie. Basaglar, Lantus, Tresiba, Toujeo, Levemir) and bolus insulin pen(ie. humalog/novolog/admelog) depending on insurance coverage to see which is covered. Please also send prescriptions for glucometer, test strips, lancets, alcohol swabs. Patient should follow up with opthalmology & podiatry after discharge. Patient can follow up with endocrine at the location provided below:     [facility]   [address] [place] [address] [address] [place] [address]  [phone]    HTN  Recommendations:   - BP goal < 130/80   - outpatient microalb/cr ratio, consider starting Acei/Arb if > 30 and no other contraindications     HLD  Recommendations:   - LDL goal < 70   - Continue  statin     HTG  - triglycerides > 1700  - Start fenofibrate 145 mg po daily  - Start lovaza 2 grams po BID    Obesity   Recommendations:   - Encouraged weight loss & lifestyle changes including dietary modifications & exercise.   - consider outpatient obesity medicine referral     Lamar Tapia  Nurse Practitioner  Division of Endocrinology & Diabetes  Pager # [phone]      If after 6PM or before 9AM, or on weekends/holidays, please call endocrine answering service for assistance ([phone]).  For nonurgent matters email [email or web address] for assistance.

## 2021-08-31 NOTE — PROGRESS NOTE ADULT - PROBLEM SELECTOR PLAN 3
6 beers per day, last drink 8/26. Has had mild withdrawal sxs in past but no seizure  -s/p librium 50mg 8/28 in ED  -pt is highly motivated to quit drinking  -No withdrawal sxs currently but pt is at risk for withdrawal while inpatient  -CIWAs have been 0.   -Mild elevation in transaminases likely iso alcohol use AST 80, ALT 50. Now downtrending

## 2021-08-31 NOTE — DIETITIAN INITIAL EVALUATION ADULT. - PROBLEM SELECTOR PLAN 2
-poor outpatient follow up, A1c was 10.8  -will need diabetes teaching, PCP follow-up, foot and eye exams  -further recs for transition to outpatient regimen per endo  -nutrition consult

## 2021-08-31 NOTE — DIETITIAN INITIAL EVALUATION ADULT. - PERTINENT MEDS FT
MEDICATIONS  (STANDING):  atorvastatin 20 milliGRAM(s) Oral at bedtime  dextrose 40% Gel 15 Gram(s) Oral once  dextrose 5% + sodium chloride 0.45%. 1000 milliLiter(s) (100 mL/Hr) IV Continuous <Continuous>  dextrose 5%. 1000 milliLiter(s) (50 mL/Hr) IV Continuous <Continuous>  dextrose 5%. 1000 milliLiter(s) (100 mL/Hr) IV Continuous <Continuous>  dextrose 50% Injectable 25 Gram(s) IV Push once  dextrose 50% Injectable 12.5 Gram(s) IV Push once  dextrose 50% Injectable 25 Gram(s) IV Push once  enoxaparin Injectable 40 milliGRAM(s) SubCutaneous daily  fenofibrate Tablet 145 milliGRAM(s) Oral daily  glucagon  Injectable 1 milliGRAM(s) IntraMuscular once  insulin glargine Injectable (LANTUS) 46 Unit(s) SubCutaneous at bedtime  insulin lispro (ADMELOG) corrective regimen sliding scale   SubCutaneous every 4 hours  omega-3-Acid Ethyl Esters 2 Gram(s) Oral two times a day  sodium chloride 0.9%. 1000 milliLiter(s) (125 mL/Hr) IV Continuous <Continuous>

## 2021-08-31 NOTE — PROGRESS NOTE ADULT - SUBJECTIVE AND OBJECTIVE BOX
Chief Complaint: type 2 DM, new DX    History:  Denies n/v.  Acidosis improved.  Denies s/s of hypoglycemia and hyperglycemia.  [place] insulin pen not glucometer and requests reinforcement of education    MEDICATIONS  (STANDING):  atorvastatin 20 milliGRAM(s) Oral at bedtime  dextrose 40% Gel 15 Gram(s) Oral once  dextrose 5%. 1000 milliLiter(s) (50 mL/Hr) IV Continuous <Continuous>  dextrose 5%. 1000 milliLiter(s) (100 mL/Hr) IV Continuous <Continuous>  dextrose 50% Injectable 25 Gram(s) IV Push once  dextrose 50% Injectable 12.5 Gram(s) IV Push once  dextrose 50% Injectable 25 Gram(s) IV Push once  enoxaparin Injectable 40 milliGRAM(s) SubCutaneous daily  fenofibrate Tablet 145 milliGRAM(s) Oral daily  glucagon  Injectable 1 milliGRAM(s) IntraMuscular once  insulin glargine Injectable (LANTUS) 46 Unit(s) SubCutaneous at bedtime  insulin lispro (ADMELOG) corrective regimen sliding scale   SubCutaneous three times a day before meals  insulin lispro (ADMELOG) corrective regimen sliding scale   SubCutaneous at bedtime  insulin lispro Injectable (ADMELOG) 5 Unit(s) SubCutaneous three times a day before meals  omega-3-Acid Ethyl Esters 2 Gram(s) Oral two times a day    MEDICATIONS  (PRN):  aluminum hydroxide/magnesium hydroxide/simethicone Suspension 30 milliLiter(s) Oral every 4 hours PRN Dyspepsia  LORazepam   Injectable 2 milliGRAM(s) IV Push every 2 hours PRN CIWA-Ar score increase by 2 points and a total score of 7 or less  LORazepam   Injectable 2 milliGRAM(s) IV Push every 1 hour PRN CIWA-Ar score 8 or greater  melatonin 3 milliGRAM(s) Oral at bedtime PRN Insomnia  ondansetron Injectable 4 milliGRAM(s) IV Push every 8 hours PRN Nausea and/or Vomiting      Allergies    No Known Allergies    Intolerances      Review of Systems:  Constitutional: No fever  Eyes: + blurry vision  ALL OTHER SYSTEMS REVIEWED AND NEGATIVE      PHYSICAL EXAM:  VITALS: T(C): 37.2 (08-31-21 @ 14:00)  T(F): 98.9 (08-31-21 @ 14:00), Max: 98.9 (08-31-21 @ 14:00)  HR: 92 (08-31-21 @ 14:00) (76 - 92)  BP: 137/86 (08-31-21 @ 14:00) (126/78 - 137/86)  RR:  (17 - 18)  SpO2:  (99% - 100%)  Wt(kg): --  GENERAL: NAD, well-developed  GI: Soft, nontender, non distended  SKIN: Dry, intact, No rashes or lesions  PSYCH: Alert and oriented x 3, normal affect, normal mood      CAPILLARY BLOOD GLUCOSE  POCT Blood Glucose.: 201 mg/dL (31 Aug 2021 13:20)  POCT Blood Glucose.: 208 mg/dL (31 Aug 2021 10:09)  POCT Blood Glucose.: 158 mg/dL (31 Aug 2021 06:16)  POCT Blood Glucose.: 153 mg/dL (31 Aug 2021 02:54)  POCT Blood Glucose.: 161 mg/dL (30 Aug 2021 22:01)  POCT Blood Glucose.: 174 mg/dL (30 Aug 2021 18:01)      08-31    134<L>  |  99  |  7   ----------------------------<  217<H>  4.2   |  22  |  0.75    EGFR if : 136  EGFR if non : 117    Ca    8.6      08-31  Mg     1.80     08-31  Phos  2.6     08-31    TPro  6.6  /  Alb  3.6  /  TBili  0.3  /  DBili  <0.2  /  AST  43<H>  /  ALT  39  /  AlkPhos  78  08-30      A1C with Estimated Average Glucose (08.28.21 @ 16:36)    A1C with Estimated Average Glucose Result: 10.8 %    Estimated Average Glucose: 263

## 2021-08-31 NOTE — DIETITIAN INITIAL EVALUATION ADULT. - PERTINENT LABORATORY DATA
08-31 @ 07:23: Na 134<L>, BUN 7, Cr 0.69, <H>, K+ 3.8 HbA1c --10.8    Triglycerides, Serum: 1726 mg/dL (08-30-21 @ 08:21)  Chol 351   HDL Low 14    CAPILLARY BLOOD GLUCOSE  POCT Blood Glucose.: 208 mg/dL (31 Aug 2021 10:09)  POCT Blood Glucose.: 158 mg/dL (31 Aug 2021 06:16)  POCT Blood Glucose.: 153 mg/dL (31 Aug 2021 02:54)  POCT Blood Glucose.: 161 mg/dL (30 Aug 2021 22:01)  POCT Blood Glucose.: 174 mg/dL (30 Aug 2021 18:01)  POCT Blood Glucose.: 202 mg/dL (30 Aug 2021 14:02)

## 2021-08-31 NOTE — DIETITIAN INITIAL EVALUATION ADULT. - PROBLEM SELECTOR PLAN 3
6 beers per day, last drink 8/26. Has had mild withdrawal sxs in past but no seizure  -s/p librium 50mg 8/28 in ED  -pt is highly motivated to quit drinking  -No withdrawal sxs currently but pt is at risk for withdrawal while inpatient  -symptom-triggered CIWAs to monitor  -Mild elevation in transaminases likely iso alcohol use AST 80, ALT 50. CTM LFTs

## 2021-08-31 NOTE — PROGRESS NOTE ADULT - ASSESSMENT
Mr. Quiroz is a 35M with h/o EtOH abuse, obesity, and pre-DM now presenting with hyperglycemia (A1c 10.8, FS 500s). Now s/p 4L bolus LR in ED with resolved DKA. Endo following and titrating insulin regimen closely.

## 2021-08-31 NOTE — PROGRESS NOTE ADULT - PROBLEM SELECTOR PLAN 2
-poor outpatient follow up, A1c was 10.8  -will need diabetes teaching, PCP follow-up, foot and eye exams  -further recs for transition to outpatient regimen per endo  -nutrition consulted  -pt will need outpatient endo, podiatry, optho followup    [facility]   [address] [id number] [phone]

## 2021-08-31 NOTE — DIETITIAN INITIAL EVALUATION ADULT. - PROBLEM SELECTOR PLAN 4
- Encouraged weight loss & lifestyle changes including dietary modifications & exercise.   - consider outpatient obesity medicine referral  - send lipid panel

## 2021-08-31 NOTE — PROGRESS NOTE ADULT - PROBLEM SELECTOR PLAN 4
- Encouraged weight loss & lifestyle changes including dietary modifications & exercise.   - consider outpatient obesity medicine referral

## 2021-08-31 NOTE — DIETITIAN INITIAL EVALUATION ADULT. - PERSON TAUGHT/METHOD
verbal instruction/written material/individual instruction/pictorial/patient instructed/teach back - (Patient repeats in own words)

## 2021-08-31 NOTE — PROGRESS NOTE ADULT - PROBLEM SELECTOR PLAN 5
Hypercholesterolemia and hypertriglyceridemia  - Lipid panel with cholesterol 351, repeat LDL 53. TG in 1700s. Possible familial hypertriglyceridemia  -start fenofibrate 145mg qd and lovaza 2g BID  -change to atorvastatin 20mg given concurrent fibrate, risk of statin myopathy

## 2021-09-01 ENCOUNTER — FORM ENCOUNTER (OUTPATIENT)
Age: 36
End: 2021-09-01

## 2021-09-01 ENCOUNTER — TRANSCRIPTION ENCOUNTER (OUTPATIENT)
Age: 36
End: 2021-09-01

## 2021-09-01 VITALS
DIASTOLIC BLOOD PRESSURE: 87 MMHG | OXYGEN SATURATION: 100 % | TEMPERATURE: 97 F | RESPIRATION RATE: 18 BRPM | SYSTOLIC BLOOD PRESSURE: 128 MMHG | HEART RATE: 95 BPM

## 2021-09-01 DIAGNOSIS — E11.65 TYPE 2 DIABETES MELLITUS WITH HYPERGLYCEMIA: ICD-10-CM

## 2021-09-01 DIAGNOSIS — Z02.9 ENCOUNTER FOR ADMINISTRATIVE EXAMINATIONS, UNSPECIFIED: ICD-10-CM

## 2021-09-01 DIAGNOSIS — E78.1 PURE HYPERGLYCERIDEMIA: ICD-10-CM

## 2021-09-01 PROBLEM — Z78.9 OTHER SPECIFIED HEALTH STATUS: Chronic | Status: ACTIVE | Noted: 2021-08-28

## 2021-09-01 PROBLEM — Z00.00 ENCOUNTER FOR PREVENTIVE HEALTH EXAMINATION: Status: ACTIVE | Noted: 2021-09-01

## 2021-09-01 LAB
ANION GAP SERPL CALC-SCNC: 16 MMOL/L — HIGH (ref 7–14)
ANION GAP SERPL CALC-SCNC: 16 MMOL/L — HIGH (ref 7–14)
B-OH-BUTYR SERPL-SCNC: 0.8 MMOL/L — HIGH (ref 0–0.4)
BUN SERPL-MCNC: 8 MG/DL — SIGNIFICANT CHANGE UP (ref 7–23)
BUN SERPL-MCNC: 8 MG/DL — SIGNIFICANT CHANGE UP (ref 7–23)
CALCIUM SERPL-MCNC: 8.7 MG/DL — SIGNIFICANT CHANGE UP (ref 8.4–10.5)
CALCIUM SERPL-MCNC: 8.9 MG/DL — SIGNIFICANT CHANGE UP (ref 8.4–10.5)
CHLORIDE SERPL-SCNC: 100 MMOL/L — SIGNIFICANT CHANGE UP (ref 98–107)
CHLORIDE SERPL-SCNC: 101 MMOL/L — SIGNIFICANT CHANGE UP (ref 98–107)
CO2 SERPL-SCNC: 19 MMOL/L — LOW (ref 22–31)
CO2 SERPL-SCNC: 23 MMOL/L — SIGNIFICANT CHANGE UP (ref 22–31)
CREAT SERPL-MCNC: 0.74 MG/DL — SIGNIFICANT CHANGE UP (ref 0.5–1.3)
CREAT SERPL-MCNC: 0.81 MG/DL — SIGNIFICANT CHANGE UP (ref 0.5–1.3)
GLUCOSE BLDC GLUCOMTR-MCNC: 159 MG/DL — HIGH (ref 70–99)
GLUCOSE BLDC GLUCOMTR-MCNC: 179 MG/DL — HIGH (ref 70–99)
GLUCOSE BLDC GLUCOMTR-MCNC: 249 MG/DL — HIGH (ref 70–99)
GLUCOSE SERPL-MCNC: 179 MG/DL — HIGH (ref 70–99)
GLUCOSE SERPL-MCNC: 183 MG/DL — HIGH (ref 70–99)
HCT VFR BLD CALC: 43.6 % — SIGNIFICANT CHANGE UP (ref 39–50)
HGB BLD-MCNC: 15 G/DL — SIGNIFICANT CHANGE UP (ref 13–17)
MAGNESIUM SERPL-MCNC: 1.9 MG/DL — SIGNIFICANT CHANGE UP (ref 1.6–2.6)
MAGNESIUM SERPL-MCNC: 1.9 MG/DL — SIGNIFICANT CHANGE UP (ref 1.6–2.6)
MCHC RBC-ENTMCNC: 29.2 PG — SIGNIFICANT CHANGE UP (ref 27–34)
MCHC RBC-ENTMCNC: 34.4 GM/DL — SIGNIFICANT CHANGE UP (ref 32–36)
MCV RBC AUTO: 85 FL — SIGNIFICANT CHANGE UP (ref 80–100)
NRBC # BLD: 0 /100 WBCS — SIGNIFICANT CHANGE UP
NRBC # FLD: 0 K/UL — SIGNIFICANT CHANGE UP
PHOSPHATE SERPL-MCNC: 2.9 MG/DL — SIGNIFICANT CHANGE UP (ref 2.5–4.5)
PHOSPHATE SERPL-MCNC: 3.8 MG/DL — SIGNIFICANT CHANGE UP (ref 2.5–4.5)
PLATELET # BLD AUTO: 207 K/UL — SIGNIFICANT CHANGE UP (ref 150–400)
POTASSIUM SERPL-MCNC: 3.7 MMOL/L — SIGNIFICANT CHANGE UP (ref 3.5–5.3)
POTASSIUM SERPL-MCNC: 3.9 MMOL/L — SIGNIFICANT CHANGE UP (ref 3.5–5.3)
POTASSIUM SERPL-SCNC: 3.7 MMOL/L — SIGNIFICANT CHANGE UP (ref 3.5–5.3)
POTASSIUM SERPL-SCNC: 3.9 MMOL/L — SIGNIFICANT CHANGE UP (ref 3.5–5.3)
RBC # BLD: 5.13 M/UL — SIGNIFICANT CHANGE UP (ref 4.2–5.8)
RBC # FLD: 13 % — SIGNIFICANT CHANGE UP (ref 10.3–14.5)
SODIUM SERPL-SCNC: 136 MMOL/L — SIGNIFICANT CHANGE UP (ref 135–145)
SODIUM SERPL-SCNC: 139 MMOL/L — SIGNIFICANT CHANGE UP (ref 135–145)
WBC # BLD: 4.45 K/UL — SIGNIFICANT CHANGE UP (ref 3.8–10.5)
WBC # FLD AUTO: 4.45 K/UL — SIGNIFICANT CHANGE UP (ref 3.8–10.5)

## 2021-09-01 PROCEDURE — 99239 HOSP IP/OBS DSCHRG MGMT >30: CPT | Mod: GC

## 2021-09-01 PROCEDURE — 99232 SBSQ HOSP IP/OBS MODERATE 35: CPT

## 2021-09-01 RX ORDER — INSULIN LISPRO 100/ML
6 VIAL (ML) SUBCUTANEOUS
Qty: 5 | Refills: 0
Start: 2021-09-01 | End: 2021-09-30

## 2021-09-01 RX ORDER — POTASSIUM CHLORIDE 20 MEQ
40 PACKET (EA) ORAL ONCE
Refills: 0 | Status: COMPLETED | OUTPATIENT
Start: 2021-09-01 | End: 2021-09-01

## 2021-09-01 RX ORDER — ENOXAPARIN SODIUM 100 MG/ML
46 INJECTION SUBCUTANEOUS
Qty: 5 | Refills: 0
Start: 2021-09-01 | End: 2021-09-30

## 2021-09-01 RX ORDER — INSULIN LISPRO 100/ML
6 VIAL (ML) SUBCUTANEOUS
Refills: 0 | Status: DISCONTINUED | OUTPATIENT
Start: 2021-09-01 | End: 2021-09-01

## 2021-09-01 RX ORDER — ATORVASTATIN CALCIUM 80 MG/1
1 TABLET, FILM COATED ORAL
Qty: 30 | Refills: 0
Start: 2021-09-01

## 2021-09-01 RX ORDER — OMEGA-3 ACID ETHYL ESTERS 1 G
2 CAPSULE ORAL
Qty: 60 | Refills: 0
Start: 2021-09-01

## 2021-09-01 RX ORDER — INSULIN GLARGINE 100 [IU]/ML
46 INJECTION, SOLUTION SUBCUTANEOUS
Qty: 5 | Refills: 0
Start: 2021-09-01 | End: 2021-09-30

## 2021-09-01 RX ORDER — FENOFIBRATE,MICRONIZED 130 MG
1 CAPSULE ORAL
Qty: 30 | Refills: 0
Start: 2021-09-01

## 2021-09-01 RX ADMIN — Medication 4: at 17:52

## 2021-09-01 RX ADMIN — Medication 145 MILLIGRAM(S): at 12:20

## 2021-09-01 RX ADMIN — Medication 2: at 08:55

## 2021-09-01 RX ADMIN — Medication 2 GRAM(S): at 17:52

## 2021-09-01 RX ADMIN — Medication 2 GRAM(S): at 05:31

## 2021-09-01 RX ADMIN — Medication 40 MILLIEQUIVALENT(S): at 12:19

## 2021-09-01 RX ADMIN — ENOXAPARIN SODIUM 40 MILLIGRAM(S): 100 INJECTION SUBCUTANEOUS at 12:20

## 2021-09-01 RX ADMIN — Medication 2: at 12:42

## 2021-09-01 RX ADMIN — Medication 5 UNIT(S): at 12:42

## 2021-09-01 RX ADMIN — Medication 5 UNIT(S): at 08:54

## 2021-09-01 RX ADMIN — Medication 6 UNIT(S): at 17:53

## 2021-09-01 NOTE — PROGRESS NOTE ADULT - PROBLEM SELECTOR PLAN 3
Hypercholesterolemia and hypertriglyceridemia  - Lipid panel with cholesterol 351, repeat LDL 53. TG in 1700s. Possible familial hypertriglyceridemia  - c/w fenofibrate 145mg qd and lovaza 2g BID  -c/w atorvastatin 20mg given concurrent fibrate, risk of statin myopathy

## 2021-09-01 NOTE — PROGRESS NOTE ADULT - ASSESSMENT
36 yo M with PMH of Pre-DM, obesity, ETOH abuse presenting with hyperglycemia, FS 500s w/ 2 weeks of polyuria, polydipsia, fatigue, suprapubic pressure. Has not followed regularly with any PCP. Went to urgent care after feeling significant weakness, found to have sugars in 500s. Endocrine consulted for hyperglycemia in the setting of newly diagnosed uncontrolled DM2 w/ mild DKA.    Mild DKA  New DM2, Uncontrolled   A1c 10.8%   + polyuria, polydypsia, numbness/tingling in feet, blurry vision, fatigue   Recommendations:     - Target -180 mg/dl  - Continue Lantus 46 units SC QHS  - Increase Admelog to 6 units TID AC  - Continue admelog moderate scale ac/hs  - Hypoglycemia protocol   - Nutrition consult appreciated  - For Bedside RN- Diabetes/Insulin PEN Teaching     DC Planning:    Please send Lantus solsotar pen and humalog kwikpen as test script to check insurance coverage.  Ok to send with current doses  If Lantus not covered- can try alternating with one of following   tresiba/basaglar/toujeo/Levemir    If Humalog not covered- can try alternating with one of following  novolog/apidra/admelog/fiasp:   Also please assure patient goes home with glucometer, test strips, lancets, alcohol pads, and BD yarelis pen needles 4 x daily    [facility]   [address] [place] [address]  [phone]  Appointments scheduled as follows:   Diabetes educator: 10/12 at 10am  Dr. Andujar: 11/30 at 1pm      HTN  Recommendations:   - BP goal < 130/80   - outpatient microalb/cr ratio, consider starting Acei/Arb if > 30 and no other contraindications     HLD  Recommendations:   - LDL goal < 70   - Continue  statin     HTG  - triglycerides > 1700  - continue fenofibrate 145 mg po daily  - Continue lovaza 2 grams po BID    Obesity   Recommendations:   - Encouraged weight loss & lifestyle changes including dietary modifications & exercise.   - consider outpatient obesity medicine referral     Lamar Tapia  Nurse Practitioner  Division of Endocrinology & Diabetes  Pager # [phone]      If after 6PM or before 9AM, or on weekends/holidays, please call endocrine answering service for assistance ([phone]).  For nonurgent matters email [email or web address] for assistance.

## 2021-09-01 NOTE — DISCHARGE NOTE NURSING/CASE MANAGEMENT/SOCIAL WORK - NSDCFUADDAPPT_GEN_ALL_CORE_FT
Please follow up with your primary care doctor within 2 weeks.    Please follow up with endocrinology on 10/12/21   [address], [place] [id number] [phone]    You will also need to make an appointment with the foot doctor (podiatrist) and the eye doctor (ophthalmologist)

## 2021-09-01 NOTE — DISCHARGE NOTE NURSING/CASE MANAGEMENT/SOCIAL WORK - PATIENT PORTAL LINK FT
You can access the FollowMyHealth Patient Portal offered by [facility] by registering at the following website: [email or web address]. By joining [employer]’s FollowMyHealth portal, you will also be able to view your health information using other applications (apps) compatible with our system.

## 2021-09-01 NOTE — PROGRESS NOTE ADULT - SUBJECTIVE AND OBJECTIVE BOX
***************************************************************  Rosamaria Moody, PGY1  Internal Medicine   pager: AGUSTINA: [phone], [facility]: [phone]  ***************************************************************    ALEXANDER GOODRICH  35y  MRN: [id number]    Patient is a 35y old  Male who presents with a chief complaint of hyperglycemia (31 Aug 2021 15:31)      Subjective: no events ON. Denies fever, CP, SOB, abn pain, N/V, dysuria. Tolerating diet.  Feels well, back at his baseline.    MEDICATIONS  (STANDING):  atorvastatin 20 milliGRAM(s) Oral at bedtime  dextrose 40% Gel 15 Gram(s) Oral once  dextrose 5%. 1000 milliLiter(s) (50 mL/Hr) IV Continuous <Continuous>  dextrose 5%. 1000 milliLiter(s) (100 mL/Hr) IV Continuous <Continuous>  dextrose 50% Injectable 25 Gram(s) IV Push once  dextrose 50% Injectable 12.5 Gram(s) IV Push once  dextrose 50% Injectable 25 Gram(s) IV Push once  enoxaparin Injectable 40 milliGRAM(s) SubCutaneous daily  fenofibrate Tablet 145 milliGRAM(s) Oral daily  glucagon  Injectable 1 milliGRAM(s) IntraMuscular once  insulin glargine Injectable (LANTUS) 46 Unit(s) SubCutaneous at bedtime  insulin lispro (ADMELOG) corrective regimen sliding scale   SubCutaneous three times a day before meals  insulin lispro (ADMELOG) corrective regimen sliding scale   SubCutaneous at bedtime  insulin lispro Injectable (ADMELOG) 5 Unit(s) SubCutaneous three times a day before meals  omega-3-Acid Ethyl Esters 2 Gram(s) Oral two times a day    MEDICATIONS  (PRN):  aluminum hydroxide/magnesium hydroxide/simethicone Suspension 30 milliLiter(s) Oral every 4 hours PRN Dyspepsia  melatonin 3 milliGRAM(s) Oral at bedtime PRN Insomnia  ondansetron Injectable 4 milliGRAM(s) IV Push every 8 hours PRN Nausea and/or Vomiting      Objective:    Vitals: Vital Signs Last 24 Hrs  T(C): 36.6 (09-01-21 @ 05:26), Max: 37.2 (08-31-21 @ 14:00)  T(F): 97.8 (09-01-21 @ 05:26), Max: 98.9 (08-31-21 @ 14:00)  HR: 87 (08-31-21 @ 22:34) (87 - 92)  BP: 130/90 (09-01-21 @ 05:26) (130/90 - 151/94)  BP(mean): --  RR: 17 (09-01-21 @ 05:26) (17 - 18)  SpO2: 100% (09-01-21 @ 05:26) (100% - 100%)            I&O's Summary      PHYSICAL EXAM:  GENERAL: NAD, obese body habitus  HEAD:  Atraumatic, Normocephalic  EYES: EOMI, conjunctiva and sclera clear  CHEST/LUNG: Clear to percussion bilaterally; No rales, rhonchi, wheezing, or rubs  HEART: Regular rate and rhythm; No murmurs, rubs, or gallops  ABDOMEN: Soft, Nontender, Nondistended;   SKIN: No rashes or lesions  NERVOUS SYSTEM:  Alert & Oriented X3, no focal deficits  EXT: No foot ulcers    LABS:  09-01    139  |  100  |  8   ----------------------------<  183<H>  3.9   |  23  |  0.81  09-01    136  |  101  |  8   ----------------------------<  179<H>  3.7   |  19<L>  |  0.74  08-31    135  |  101  |  9   ----------------------------<  236<H>  3.9   |  21<L>  |  0.78    Ca    8.9      01 Sep 2021 12:10  Ca    8.7      01 Sep 2021 08:16  Ca    8.6      31 Aug 2021 20:17  Phos  2.9     09-01  Mg     1.90     09-01    TPro  6.6  /  Alb  3.6  /  TBili  0.3  /  DBili  <0.2  /  AST  43<H>  /  ALT  39  /  AlkPhos  78  08-30                                              15.0   4.45  )-----------( 207      ( 01 Sep 2021 08:16 )             43.6                         14.6   5.29  )-----------( 222      ( 31 Aug 2021 07:57 )             42.6                         14.3   5.35  )-----------( 212      ( 30 Aug 2021 08:21 )             42.7     CAPILLARY BLOOD GLUCOSE      POCT Blood Glucose.: 159 mg/dL (01 Sep 2021 12:34)  POCT Blood Glucose.: 179 mg/dL (01 Sep 2021 08:42)  POCT Blood Glucose.: 216 mg/dL (31 Aug 2021 22:35)  POCT Blood Glucose.: 184 mg/dL (31 Aug 2021 18:10)  POCT Blood Glucose.: 201 mg/dL (31 Aug 2021 13:20)      RADIOLOGY & ADDITIONAL TESTS:    Imaging Personally Reviewed:  [x ] YES  [ ] NO    Consultants involved in case:   Consultant(s) Notes Reviewed:  [ x] YES  [ ] NO:   Care Discussed with Consultants/Other Providers [x ] YES  [ ] NO

## 2021-09-01 NOTE — DISCHARGE NOTE NURSING/CASE MANAGEMENT/SOCIAL WORK - NSDCPEFALRISK_GEN_ALL_CORE
For information on Fall & injury Prevention, visit [email or web address]

## 2021-09-01 NOTE — PROGRESS NOTE ADULT - PROBLEM SELECTOR PLAN 6
Diet: CC diet  DVT ppx: lovenox 40 qd
Diet: CC diet  DVT ppx: lovenox 40 qd
-TG jump to 1700s  -has mild suprapubic pain, but concern for pancreatitis if pain worsens in RUQ

## 2021-09-01 NOTE — PROGRESS NOTE ADULT - SUBJECTIVE AND OBJECTIVE BOX
Chief Complaint: type 2 DM, new DX    History:  Denies n/v.  Acidosis improved.  Denies s/s of hypoglycemia and hyperglycemia.  [place] insulin pen and glucometer.    MEDICATIONS  (STANDING):  atorvastatin 20 milliGRAM(s) Oral at bedtime  dextrose 40% Gel 15 Gram(s) Oral once  dextrose 5%. 1000 milliLiter(s) (50 mL/Hr) IV Continuous <Continuous>  dextrose 5%. 1000 milliLiter(s) (100 mL/Hr) IV Continuous <Continuous>  dextrose 50% Injectable 25 Gram(s) IV Push once  dextrose 50% Injectable 12.5 Gram(s) IV Push once  dextrose 50% Injectable 25 Gram(s) IV Push once  enoxaparin Injectable 40 milliGRAM(s) SubCutaneous daily  fenofibrate Tablet 145 milliGRAM(s) Oral daily  glucagon  Injectable 1 milliGRAM(s) IntraMuscular once  insulin glargine Injectable (LANTUS) 46 Unit(s) SubCutaneous at bedtime  insulin lispro (ADMELOG) corrective regimen sliding scale   SubCutaneous three times a day before meals  insulin lispro (ADMELOG) corrective regimen sliding scale   SubCutaneous at bedtime  insulin lispro Injectable (ADMELOG) 5 Unit(s) SubCutaneous three times a day before meals  omega-3-Acid Ethyl Esters 2 Gram(s) Oral two times a day    MEDICATIONS  (PRN):  aluminum hydroxide/magnesium hydroxide/simethicone Suspension 30 milliLiter(s) Oral every 4 hours PRN Dyspepsia  LORazepam   Injectable 2 milliGRAM(s) IV Push every 2 hours PRN CIWA-Ar score increase by 2 points and a total score of 7 or less  LORazepam   Injectable 2 milliGRAM(s) IV Push every 1 hour PRN CIWA-Ar score 8 or greater  melatonin 3 milliGRAM(s) Oral at bedtime PRN Insomnia  ondansetron Injectable 4 milliGRAM(s) IV Push every 8 hours PRN Nausea and/or Vomiting      Allergies    No Known Allergies    Intolerances      Review of Systems:  Constitutional: No fever  Eyes: + blurry vision  ALL OTHER SYSTEMS REVIEWED AND NEGATIVE      PHYSICAL EXAM:  Vital Signs Last 24 Hrs  T(C): 36.3 (01 Sep 2021 14:07), Max: 36.7 (31 Aug 2021 22:34)  T(F): 97.3 (01 Sep 2021 14:07), Max: 98 (31 Aug 2021 22:34)  HR: 95 (01 Sep 2021 14:07) (87 - 95)  BP: 128/87 (01 Sep 2021 14:07) (128/87 - 151/94)  BP(mean): --  RR: 18 (01 Sep 2021 14:07) (17 - 18)  SpO2: 100% (01 Sep 2021 14:07) (100% - 100%)  GENERAL: NAD, well-developed  GI: Soft, nontender, non distended  SKIN: Dry, intact, No rashes or lesions  PSYCH: Alert and oriented x 3, normal affect, normal mood      CAPILLARY BLOOD GLUCOSE  POCT Blood Glucose.: 159 mg/dL (01 Sep 2021 12:34)  POCT Blood Glucose.: 179 mg/dL (01 Sep 2021 08:42)  POCT Blood Glucose.: 216 mg/dL (31 Aug 2021 22:35)  POCT Blood Glucose.: 184 mg/dL (31 Aug 2021 18:10)  POCT Blood Glucose.: 201 mg/dL (31 Aug 2021 13:20)  POCT Blood Glucose.: 208 mg/dL (31 Aug 2021 10:09)  POCT Blood Glucose.: 158 mg/dL (31 Aug 2021 06:16)  POCT Blood Glucose.: 153 mg/dL (31 Aug 2021 02:54)  POCT Blood Glucose.: 161 mg/dL (30 Aug 2021 22:01)  POCT Blood Glucose.: 174 mg/dL (30 Aug 2021 18:01)      09-01    139  |  100  |  8   ----------------------------<  183<H>  3.9   |  23  |  0.81    Ca    8.9      01 Sep 2021 12:10  Phos  2.9     09-01  Mg     1.90     09-01          A1C with Estimated Average Glucose (08.28.21 @ 16:36)    A1C with Estimated Average Glucose Result: 10.8 %    Estimated Average Glucose: 263

## 2021-09-01 NOTE — PROGRESS NOTE ADULT - PROBLEM SELECTOR PLAN 7
Home with home health  -diabetes education ordered
Diet: NPO except water with meds until DKA resolves  DVT ppx: lovenox 40 qd

## 2021-09-01 NOTE — PROGRESS NOTE ADULT - PROBLEM SELECTOR PLAN 2
Mild DKA given VBG pH 7.34, BHB 2.6, AG 27, BGs 500s. Treated with subq insulin. Now resolved.  -Now s/p 4L IVF boluses, 31u lantus and 10u premeals and sugars in 300s  -Endo consulted, appreciate recs:  -DKA now resolved, Gap 13.  -transition to PO CC diet, lantus 46u qHs, 5u premeals, medium dose sliding scale  -BMPs 9/1 gap 16 but bicarb 21

## 2021-09-01 NOTE — PROGRESS NOTE ADULT - PROBLEM SELECTOR PLAN 1
-poor outpatient follow up, A1c was 10.8  -ordered diabetes teaching  -further recs for transition to outpatient regimen per endo  -nutrition consulted, recs in chart  -pt will need outpatient endo, podiatry, optho followup    [facility]   [address] [id number] [phone]

## 2021-09-01 NOTE — PROGRESS NOTE ADULT - ATTENDING COMMENTS
35 year old male with history of pre-DM, obesity, EtOH abuse, presented with weakness, polyuria, polydipsia, found to have glucose in 500s, A1C 10.8, AG 27, elevated beta hydroxy, consistent with mild DKA. Now with AG closed x 2, now diet restarted.     #T2DM c/b DKA:   -resolved, now with AG closed x 2  -Carb consistent diet started   -c/w Lantus 46 u qhs and admelog 5 u tidac      #EtOH abuse:   -continue to monitor on CIWA, currently with score of 0    #Hypertriglyceridemia:   -TG 1726, c/w fenofibrate and Lovaza  -c/w atorvastatin 20 mg daily
35 year old male with history of pre-DM, obesity, EtOH abuse, presented with weakness, polyuria, polydipsia, found to have glucose in 500s, A1C 10.8, AG 27, elevated beta hydroxy, consistent with mild DKA. S/p with AG closed x 2, now diet restarted.     #T2DM c/b DKA:   -transitioned to carb consistent diet s/p AG closed x 2, now with small gap of 16 x 2 BMPs. BHB added. If wnl, will still plan for d/c today  -will confirm with endo regarding final recs        #EtOH abuse:   -continue to monitor on CIWA, currently with score of 0 x 72 hours. Can d/c CIWA    #Hypertriglyceridemia:   -TG 1726, c/w fenofibrate and Lovaza  -c/w atorvastatin 20 mg daily
35 year old male with history of pre-DM, obesity, EtOH abuse, presented with weakness, polyuria, polydipsia, found to have glucose in 500s, A1C 10.8, AG 27, elevated beta hydroxy, consistent with mild DKA.     #DKA:   -AG most recently at 19  -will continue NPO. Endocrinology following. Increase Lantus to 46 u qhs, moderate dose ISS q4h  -will need to initiate D5 1/2 NS for BG <200  -given A1C of 11%, will need to be d/pradeep home on likely basal insulin. Will need RN to provide insulin teaching prior to d/c     #EtOH abuse:   -continue to monitor on CIWA, currently with score of 0    #Hypertriglyceridemia:   -TG 1726, agree with starting fenofibrate and Lovaza  -obtain LDL

## 2021-10-02 ENCOUNTER — APPOINTMENT (OUTPATIENT)
Dept: OPHTHALMOLOGY | Facility: CLINIC | Age: 36
End: 2021-10-02

## 2021-10-12 ENCOUNTER — APPOINTMENT (OUTPATIENT)
Dept: ENDOCRINOLOGY | Facility: CLINIC | Age: 36
End: 2021-10-12

## 2021-11-30 ENCOUNTER — APPOINTMENT (OUTPATIENT)
Dept: ENDOCRINOLOGY | Facility: CLINIC | Age: 36
End: 2021-11-30

## 2022-10-14 NOTE — CONSULT NOTE ADULT - ASSESSMENT
Patient needs results right away stated [employer] [facility] called her and she missed it. Wants [facility]onna to call her before closing today.   DKA  New DM2, Uncontrolled     NPO   IVF Hydration   If Blood glucose less then 250mg/dL, start Dextrose containing IVF and continue insulin treatment per DKA protocol   Lantus   Admelog   Moderate Correction Scale   Hypoglycemia protocol   BG Monitoring   Nutrition eval       HTN     HLD     Obesity     Discussed w/ Dr. Telma Benavidez DO   Endocrinology Fellow   Please call [phone] after hours and on weekends/holidays.    Mild DKA  New DM2, Uncontrolled   A1c 10.8%   Recommendations:   - NPO until DKA resolved, AG < 14, Bicarb > 18  - IVF Hydration   - If Blood glucose less then 250mg/dL, start Dextrose containing IVF and continue insulin treatment per DKA protocol   - Lantus 36 units SC QHS   - Start Admelog 15 units [id number] until BG less then 250mg/dL  - Once BG level less than 250mg/dL, Decrease Admelog to 7 units SC [id number]   - Once DKA resolved, AG < 14 and Bicarb > 18:   ·	Decrease Admelog to 7 units SC TIDAC/Premeal  ·	Restart Carb Consistent Diet   Moderate Correction Scale [id number] >> change to tidac/premeal once dka resolved & po diet resumed   FS BG Monitoring [id number] until resolution of DKA >> change to tidac/premeal & bedtime once dka resolved & po diet resumed   Hypoglycemia protocol   Nutrition consult   Diabetes/Insulin Teaching     DC Planning: likely on basal/bolus regimen vs basal/oral depending on patient acceptance. Doses/DC regimen to be determined. Patient will need nutrition consult, diabetes/insulin teaching prior to discharge. Please send Test RX for basal insulin pen (ie. Basaglar, Lantus, Tresiba, Toujeo, Levemir) and bolus insulin pen(ie. humalog/novolog/admelog) depending on insurance coverage to see which is covered. Please also send prescriptions for glucometer, test strips, lancets, alcohol swabs. Patient can follow up with endocrine at the location provided below:     [facility]   [address] [place] [address]  [phone]    HTN  Recommendations:   - BP goal < 130/80   - outpatient microalb/cr ratio, consider starting Acei/Arb if > 30 and no other contraindications     HLD  Recommendations:   - LDL goal < 70   - Fasting lipid profile   - Consider starting statin if above goal     Obesity   Recommendations:   - Encouraged weight loss & lifestyle changes including dietary modifications & exercise.   - consider outpatient obesity medicine referral     Discussed w/ Dr. Oseguera and Primary Team.     Telma Benavidez DO   Endocrinology Fellow   Please call [phone] after hours and on weekends/holidays.    Mild DKA  New DM2, Uncontrolled   A1c 10.8%   Recommendations:   - NPO until DKA resolved, AG < 14, Bicarb > 18  - IVF Hydration   - If Blood glucose less then 250mg/dL, start Dextrose containing IVF and continue insulin treatment per DKA protocol   - Lantus 36 units SC QHS   - Start Admelog 15 units [id number] until BG less then 250mg/dL  - Once BG level less than 250mg/dL, Decrease Admelog to 7 units SC [id number]   - Once DKA resolved, AG < 14 and Bicarb > 18:   ·	Decrease Admelog to 7 units SC TIDAC/Premeal  ·	Restart Carb Consistent Diet   Moderate Correction Scale [id number] >> change to tidac/premeal once dka resolved & po diet resumed   FS BG Monitoring [id number] until resolution of DKA >> change to tidac/premeal & bedtime once dka resolved & po diet resumed   Hypoglycemia protocol   Nutrition consult   Diabetes/Insulin Teaching     DC Planning: likely on basal/bolus regimen vs basal/oral depending on patient acceptance. Doses/DC regimen to be determined. Patient will need nutrition consult, diabetes/insulin teaching prior to discharge. Please send Test RX for basal insulin pen (ie. Basaglar, Lantus, Tresiba, Toujeo, Levemir) and bolus insulin pen(ie. humalog/novolog/admelog) depending on insurance coverage to see which is covered. Please also send prescriptions for glucometer, test strips, lancets, alcohol swabs. Patient can follow up with endocrine at the location provided below:     [facility]   [address] [place] [address]  [phone]    HTN  Recommendations:   - BP goal < 130/80   - outpatient microalb/cr ratio, consider starting Acei/Arb if > 30 and no other contraindications     HLD  Recommendations:   - LDL goal < 70   - Fasting lipid profile   - Consider starting statin if above goal     Obesity   Recommendations:   - Encouraged weight loss & lifestyle changes including dietary modifications & exercise.   - consider outpatient obesity medicine referral     Discussed w/  and Primary Team.     Telma Benavidez DO   Endocrinology Fellow   Please call [phone] after hours and on weekends/holidays.    Mild DKA  New DM2, Uncontrolled   A1c 10.8%   Recommendations:   - Trend BMP/BHB/VBG [id number]   - NPO until DKA resolved, AG < 14, Bicarb > 18  - IVF Hydration   - FS BG Monitoring [id number] until resolution of DKA >> change to tidac/premeal & bedtime once dka resolved & po diet resumed   - If Blood glucose less then 250mg/dL, start Dextrose containing IVF and continue insulin treatment per DKA protocol   - Lantus 36 units SC QHS   - Start Admelog 15 units [id number] until BG less then 250mg/dL  - Once BG level less than 250mg/dL, Decrease Admelog to 7 units SC [id number]   - Once DKA resolved, AG < 14 and Bicarb > 18:   ·	Decrease Admelog to 7 units SC TIDAC/Premeal  ·	Restart Carb Consistent Diet   ·	Moderate Correction Scale tidac/premeal once dka resolved & po diet resumed   ·	Continue Lantus 36 units SC QHS   - Hypoglycemia protocol   - Nutrition consult   - Diabetes/Insulin Teaching     DC Planning: likely on basal/bolus regimen vs basal/oral depending on patient acceptance. Doses/DC regimen to be determined. Patient will need nutrition consult, diabetes/insulin teaching prior to discharge. Please send Test RX for basal insulin pen (ie. Basaglar, Lantus, Tresiba, Toujeo, Levemir) and bolus insulin pen(ie. humalog/novolog/admelog) depending on insurance coverage to see which is covered. Please also send prescriptions for glucometer, test strips, lancets, alcohol swabs. Patient can follow up with endocrine at the location provided below:     [facility]   [address] [place] [address] [address] [place] [address]  [phone]    HTN  Recommendations:   - BP goal < 130/80   - outpatient microalb/cr ratio, consider starting Acei/Arb if > 30 and no other contraindications     HLD  Recommendations:   - LDL goal < 70   - Fasting lipid profile   - Consider starting statin if above goal     Obesity   Recommendations:   - Encouraged weight loss & lifestyle changes including dietary modifications & exercise.   - consider outpatient obesity medicine referral     Discussed w/  and Primary Team.     Telma Benavidez DO   Endocrinology Fellow   Please call [phone] after hours and on weekends/holidays.  36 yo M with PMH of Pre-DM, obesity, ETOH abuse presenting with hyperglycemia, FS 500s w/ 2 weeks of polyuria, polydipsia, fatigue, suprapubic pressure. Has not followed regularly with any PCP. Went to urgent care after feeling significant weakness, found to have sugars in 500s. Endocrine consulted for hyperglycemia in the setting of newly diagnosed uncontrolled DM2 w/ mild DKA.    Mild DKA  New DM2, Uncontrolled   A1c 10.8%   + polyuria, polydypsia, numbness/tingling in feet, blurry vision, fatigue   Recommendations:   - Trend BMP/BHB/VBG [id number]   - NPO until DKA resolved, AG < 14, Bicarb > 18  - IVF Hydration   - FS BG Monitoring [id number] until resolution of DKA >> change to tidac/premeal & bedtime once dka resolved & po diet resumed   - If Blood glucose less then 250mg/dL, start Dextrose containing IVF and continue insulin treatment per DKA protocol   - Lantus 36 units SC QHS   - Start Admelog 15 units [id number] until BG less then 250mg/dL  - Once BG level less than 250mg/dL, Decrease Admelog to 7 units SC [id number]   - Once DKA resolved, AG < 14 and Bicarb > 18:   ·	Decrease Admelog to 7 units SC TIDAC/Premeal  ·	Restart Carb Consistent Diet   ·	Moderate Correction Scale tidac/premeal once dka resolved & po diet resumed   ·	Continue Lantus 36 units SC QHS   - Hypoglycemia protocol   - Nutrition consult   - Diabetes/Insulin Teaching     DC Planning: likely on basal/bolus regimen vs basal/oral depending on patient acceptance. Doses/DC regimen to be determined. Patient will need nutrition consult, diabetes/insulin teaching prior to discharge. Please send Test RX for basal insulin pen (ie. Basaglar, Lantus, Tresiba, Toujeo, Levemir) and bolus insulin pen(ie. humalog/novolog/admelog) depending on insurance coverage to see which is covered. Please also send prescriptions for glucometer, test strips, lancets, alcohol swabs. Patient should follow up with opthalmology & podiatry after discharge. Patient can follow up with endocrine at the location provided below:     [facility]   [address] [place] [address] [address]  [facility] [id number] [phone]    HTN  Recommendations:   - BP goal < 130/80   - outpatient microalb/cr ratio, consider starting Acei/Arb if > 30 and no other contraindications     HLD  Recommendations:   - LDL goal < 70   - Fasting lipid profile   - Consider starting statin if above goal     Obesity   Recommendations:   - Encouraged weight loss & lifestyle changes including dietary modifications & exercise.   - consider outpatient obesity medicine referral     Discussed w/  and Primary Team.     Telma Benavidez DO   Endocrinology Fellow   Please call [phone] after hours and on weekends/holidays.

## 2023-10-20 NOTE — ED CDU PROVIDER DISPOSITION NOTE - CLINICAL COURSE
36 yo M with PMH of Pre-DM, obesity, ETOH abuse, sent from urgent care to Er c/o hyperglycemia with FS 500s w/ 2 weeks of polyuria, polydipsia, fatigue. Drinks 6 beers daily, no hx of withdrawal. In ED labs showing anion gap 27, pH normal, beta hydroxy 2.0. After 3L fluids, Humilin R 5 units pt sent to CDU. In CDU received maintenance fluids, Lantus 31 units at bedtime, Admelog 10 units with meals and sliding scale. Pt continues to have hyperglycemia, cbc w/ glucose 400 this morning, pH normal on rpt gas. Endocrine previously consulted, plan for admission.
No

## 2024-01-26 NOTE — PATIENT PROFILE ADULT - NSPROPOAPRESSUREINJURY_GEN_A_NUR
Blood work reviewed.  Cholesterol liver kidney sugar thyroid levels look good.  Continue current medicine no